# Patient Record
Sex: MALE | Race: WHITE | NOT HISPANIC OR LATINO | URBAN - METROPOLITAN AREA
[De-identification: names, ages, dates, MRNs, and addresses within clinical notes are randomized per-mention and may not be internally consistent; named-entity substitution may affect disease eponyms.]

---

## 2018-05-24 ENCOUNTER — EMERGENCY (EMERGENCY)
Facility: HOSPITAL | Age: 23
LOS: 0 days | Discharge: HOME | End: 2018-05-24
Admitting: PHYSICIAN ASSISTANT

## 2018-05-24 VITALS
TEMPERATURE: 98 F | SYSTOLIC BLOOD PRESSURE: 172 MMHG | DIASTOLIC BLOOD PRESSURE: 96 MMHG | RESPIRATION RATE: 18 BRPM | HEART RATE: 98 BPM | OXYGEN SATURATION: 99 %

## 2018-05-24 DIAGNOSIS — S92.252A DISPLACED FRACTURE OF NAVICULAR [SCAPHOID] OF LEFT FOOT, INITIAL ENCOUNTER FOR CLOSED FRACTURE: ICD-10-CM
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
## 2018-05-24 DIAGNOSIS — S99.912A UNSPECIFIED INJURY OF LEFT ANKLE, INITIAL ENCOUNTER: ICD-10-CM
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
## 2018-05-24 NOTE — ED PROVIDER NOTE - PROGRESS NOTE DETAILS
Patient advised of possible navicular fx. He will f/u with his own orthopedist. Patient advised of possible navicular fx. He will f/u with his own orthopedist.  *****FRACTURE CARE*****

## 2018-05-24 NOTE — ED PROVIDER NOTE - NS ED ROS FT
Constitutional: (-) fever  Skin: No rash  Muskuloskeletal: left foot/ankle pain  Neurological: (-) altered mental status
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
## 2018-05-24 NOTE — ED PROVIDER NOTE - PHYSICAL EXAMINATION
Physical Exam    Vital Signs: I have reviewed the initial vital signs.  Constitutional: well-nourished, appears stated age, no acute distress  Skin: warm, dry, No rash  Muskuloskeletal: Left ankle/foot: +tender, swelling to ankle and dorsum of foot. No ecchymosis. Pain with ROM. n/v intact. limping gait in ED  Neuro: AOx3, Motor 5/5, Sensation: equal and intact

## 2019-08-04 ENCOUNTER — EMERGENCY (EMERGENCY)
Facility: HOSPITAL | Age: 24
LOS: 0 days | Discharge: HOME | End: 2019-08-04
Attending: EMERGENCY MEDICINE | Admitting: EMERGENCY MEDICINE
Payer: OTHER MISCELLANEOUS

## 2019-08-04 VITALS
TEMPERATURE: 98 F | RESPIRATION RATE: 18 BRPM | HEART RATE: 90 BPM | DIASTOLIC BLOOD PRESSURE: 60 MMHG | OXYGEN SATURATION: 98 % | SYSTOLIC BLOOD PRESSURE: 134 MMHG

## 2019-08-04 VITALS
DIASTOLIC BLOOD PRESSURE: 81 MMHG | SYSTOLIC BLOOD PRESSURE: 147 MMHG | RESPIRATION RATE: 17 BRPM | TEMPERATURE: 98 F | HEART RATE: 125 BPM | OXYGEN SATURATION: 97 %

## 2019-08-04 DIAGNOSIS — R07.89 OTHER CHEST PAIN: ICD-10-CM

## 2019-08-04 DIAGNOSIS — M25.511 PAIN IN RIGHT SHOULDER: ICD-10-CM

## 2019-08-04 DIAGNOSIS — S70.11XA CONTUSION OF RIGHT THIGH, INITIAL ENCOUNTER: ICD-10-CM
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
## 2019-08-04 DIAGNOSIS — S40.211A ABRASION OF RIGHT SHOULDER, INITIAL ENCOUNTER: ICD-10-CM

## 2019-08-04 DIAGNOSIS — S42.101A FRACTURE OF UNSPECIFIED PART OF SCAPULA, RIGHT SHOULDER, INITIAL ENCOUNTER FOR CLOSED FRACTURE: ICD-10-CM

## 2019-08-04 DIAGNOSIS — M25.519 PAIN IN UNSPECIFIED SHOULDER: ICD-10-CM

## 2019-08-04 DIAGNOSIS — S71.101A UNSPECIFIED OPEN WOUND, RIGHT THIGH, INITIAL ENCOUNTER: ICD-10-CM
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
## 2019-08-04 DIAGNOSIS — R06.02 SHORTNESS OF BREATH: ICD-10-CM

## 2019-08-04 LAB
ALBUMIN SERPL ELPH-MCNC: 3.7 G/DL — SIGNIFICANT CHANGE UP (ref 3.5–5.2)
ALP SERPL-CCNC: 43 U/L — SIGNIFICANT CHANGE UP (ref 30–115)
ALT FLD-CCNC: 16 U/L — SIGNIFICANT CHANGE UP (ref 0–41)
ANION GAP SERPL CALC-SCNC: 10 MMOL/L — SIGNIFICANT CHANGE UP (ref 7–14)
AST SERPL-CCNC: 34 U/L — SIGNIFICANT CHANGE UP (ref 0–41)
BASOPHILS # BLD AUTO: 0.05 K/UL — SIGNIFICANT CHANGE UP (ref 0–0.2)
BASOPHILS NFR BLD AUTO: 0.5 % — SIGNIFICANT CHANGE UP (ref 0–1)
BILIRUB SERPL-MCNC: 0.6 MG/DL — SIGNIFICANT CHANGE UP (ref 0.2–1.2)
BUN SERPL-MCNC: 14 MG/DL — SIGNIFICANT CHANGE UP (ref 10–20)
CALCIUM SERPL-MCNC: 7.3 MG/DL — LOW (ref 8.5–10.1)
CHLORIDE SERPL-SCNC: 102 MMOL/L — SIGNIFICANT CHANGE UP (ref 98–110)
CO2 SERPL-SCNC: 29 MMOL/L — SIGNIFICANT CHANGE UP (ref 17–32)
CREAT SERPL-MCNC: 0.6 MG/DL — LOW (ref 0.7–1.5)
D DIMER BLD IA.RAPID-MCNC: 462 NG/ML DDU — HIGH (ref 0–230)
EOSINOPHIL # BLD AUTO: 0.12 K/UL — SIGNIFICANT CHANGE UP (ref 0–0.7)
EOSINOPHIL NFR BLD AUTO: 1.2 % — SIGNIFICANT CHANGE UP (ref 0–8)
GLUCOSE SERPL-MCNC: 91 MG/DL — SIGNIFICANT CHANGE UP (ref 70–99)
HCT VFR BLD CALC: 23.8 % — LOW (ref 42–52)
HGB BLD-MCNC: 8 G/DL — LOW (ref 14–18)
IMM GRANULOCYTES NFR BLD AUTO: 1.6 % — HIGH (ref 0.1–0.3)
LYMPHOCYTES # BLD AUTO: 2.88 K/UL — SIGNIFICANT CHANGE UP (ref 1.2–3.4)
LYMPHOCYTES # BLD AUTO: 29.2 % — SIGNIFICANT CHANGE UP (ref 20.5–51.1)
MCHC RBC-ENTMCNC: 28.6 PG — SIGNIFICANT CHANGE UP (ref 27–31)
MCHC RBC-ENTMCNC: 33.6 G/DL — SIGNIFICANT CHANGE UP (ref 32–37)
MCV RBC AUTO: 85 FL — SIGNIFICANT CHANGE UP (ref 80–94)
MONOCYTES # BLD AUTO: 0.9 K/UL — HIGH (ref 0.1–0.6)
MONOCYTES NFR BLD AUTO: 9.1 % — SIGNIFICANT CHANGE UP (ref 1.7–9.3)
NEUTROPHILS # BLD AUTO: 5.76 K/UL — SIGNIFICANT CHANGE UP (ref 1.4–6.5)
NEUTROPHILS NFR BLD AUTO: 58.4 % — SIGNIFICANT CHANGE UP (ref 42.2–75.2)
NRBC # BLD: 0 /100 WBCS — SIGNIFICANT CHANGE UP (ref 0–0)
PLATELET # BLD AUTO: 195 K/UL — SIGNIFICANT CHANGE UP (ref 130–400)
POTASSIUM SERPL-MCNC: 3.3 MMOL/L — LOW (ref 3.5–5)
POTASSIUM SERPL-SCNC: 3.3 MMOL/L — LOW (ref 3.5–5)
PROT SERPL-MCNC: 5.7 G/DL — LOW (ref 6–8)
RBC # BLD: 2.8 M/UL — LOW (ref 4.7–6.1)
RBC # FLD: 12.6 % — SIGNIFICANT CHANGE UP (ref 11.5–14.5)
SODIUM SERPL-SCNC: 141 MMOL/L — SIGNIFICANT CHANGE UP (ref 135–146)
WBC # BLD: 9.87 K/UL — SIGNIFICANT CHANGE UP (ref 4.8–10.8)
WBC # FLD AUTO: 9.87 K/UL — SIGNIFICANT CHANGE UP (ref 4.8–10.8)

## 2019-08-04 PROCEDURE — 71045 X-RAY EXAM CHEST 1 VIEW: CPT | Mod: 26

## 2019-08-04 PROCEDURE — 73030 X-RAY EXAM OF SHOULDER: CPT | Mod: 26,RT

## 2019-08-04 PROCEDURE — 71275 CT ANGIOGRAPHY CHEST: CPT | Mod: 26

## 2019-08-04 PROCEDURE — 99284 EMERGENCY DEPT VISIT MOD MDM: CPT

## 2019-08-04 RX ORDER — OXYCODONE AND ACETAMINOPHEN 5; 325 MG/1; MG/1
2 TABLET ORAL ONCE
Refills: 0 | Status: DISCONTINUED | OUTPATIENT
Start: 2019-08-04 | End: 2019-08-04

## 2019-08-04 RX ORDER — ACETAMINOPHEN 500 MG
650 TABLET ORAL ONCE
Refills: 0 | Status: DISCONTINUED | OUTPATIENT
Start: 2019-08-04 | End: 2019-08-04

## 2019-08-04 RX ADMIN — OXYCODONE AND ACETAMINOPHEN 2 TABLET(S): 5; 325 TABLET ORAL at 13:03

## 2019-08-04 RX ADMIN — OXYCODONE AND ACETAMINOPHEN 2 TABLET(S): 5; 325 TABLET ORAL at 19:04

## 2019-08-04 RX ADMIN — OXYCODONE AND ACETAMINOPHEN 2 TABLET(S): 5; 325 TABLET ORAL at 13:45
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
## 2019-08-04 NOTE — ED PROVIDER NOTE - PROVIDER TOKENS
FREE:[LAST:[[facility] trauma surgery],PHONE:[(   )    -],FAX:[(   )    -],FOLLOWUP:[1-3 Days]]

## 2019-08-04 NOTE — ED ADULT NURSE REASSESSMENT NOTE - NS ED NURSE REASSESS COMMENT FT1
Pts R back dressing changed, xeroform applied as per dressing that was removed. ABD pad placed on pts R hip and VANE drain site.

## 2019-08-04 NOTE — ED PROVIDER NOTE - CARE PLAN
Principal Discharge DX:	Acute pain of right shoulder  Secondary Diagnosis:	Scapula fracture  Secondary Diagnosis:	Wound  Secondary Diagnosis:	Shortness of breath

## 2019-08-04 NOTE — ED PROVIDER NOTE - PHYSICAL EXAMINATION
Vital Signs: I have reviewed the initial vital signs.  Constitutional: NAD, well-nourished, appears stated age, no acute distress.  HEENT: Airway patent, moist MM, no erythema/swelling/deformity of oral structures. EOMI, PERRLA.  CV: regular rate, regular rhythm, well-perfused extremities, 2+ b/l DP and radial pulses equal.  Lungs: BCTA, no increased WOB.  ABD: NTND, no guarding or rebound, no pulsatile mass, no hernias.   MSK: Neck supple, nontender, nl ROM, no stepoff. Chest nontender. Back nontender in TLS spine or to b/l bony structures or flanks. RLE in boot 2/2 ankle sprain  INTEG: Skin warm, dry, no rash. significant abraded skin to posterolateral R chest wall near shoulder. R hip wound site clean, dressing in place. VANE with 10cc serosanguinous fluid  NEURO: A&Ox3, moving all extremities, normal speech  PSYCH: Calm, cooperative, normal affect and interaction.
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
## 2019-08-04 NOTE — ED PROVIDER NOTE - NS ED ROS FT
Eyes:  No visual changes, eye pain or discharge.  ENMT:  No hearing changes, pain, no sore throat or runny nose, no difficulty swallowing  Cardiac:  +cp, sob. No chest pain with exertion.  Respiratory:  No cough or respiratory distress.    GI:  No nausea, vomiting, diarrhea or abdominal pain.  :  No dysuria, frequency or burning.  MS:  No myalgia, muscle weakness,+ joint pain. no back pain.  Neuro:  No headache or weakness.  No LOC.  Skin:  No skin rash. +wound site  Endocrine: No history of thyroid disease or diabetes.
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
## 2019-08-04 NOTE — ED PROVIDER NOTE - NSFOLLOWUPINSTRUCTIONS_ED_ALL_ED_FT
Shortness of breath    Shortness of breath (dyspnea) means you have trouble breathing and could indicate a medical problem. Causes include lung disease, heart disease, low amount of red blood cells (anemia), poor physical fitness, being overweight, smoking, etc. Your health care provider today may not be able to find a cause for your shortness of breath after your exam. In this case, it is important to have a follow-up exam with your primary care physician as instructed. If medicines were prescribed, take them as directed for the full length of time directed. Refrain from tobacco products.    SEEK IMMEDIATE MEDICAL CARE IF YOU HAVE ANY OF THE FOLLOWING SYMPTOMS: worsening shortness of breath, chest pain, back pain, abdominal pain, fever, coughing up blood, lightheadedness/dizziness.    Chest Pain    Chest pain can be caused by many different conditions which may or may not be dangerous. Causes include heartburn, lung infections, heart attack, blood clot in lungs, skin infections, strain or damage to muscle, cartilage, or bones, etc. In addition to a history and physical examination, an electrocardiogram (ECG) or other lab tests may have been performed to determine the cause of your chest pain. Follow up with your primary care provider or with a cardiologist as instructed.     SEEK IMMEDIATE MEDICAL CARE IF YOU HAVE ANY OF THE FOLLOWING SYMPTOMS: worsening chest pain, coughing up blood, unexplained back/neck/jaw pain, severe abdominal pain, dizziness or lightheadedness, fainting, shortness of breath, sweaty or clammy skin, vomiting, or racing heart beat. These symptoms may represent a serious problem that is an emergency. Do not wait to see if the symptoms will go away. Get medical help right away. Call 911 and do not drive yourself to the hospital.
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
## 2019-08-04 NOTE — ED PROVIDER NOTE - CARE PROVIDER_API CALL
[facility] trauma surgery,   Phone: (   )    -  Fax: (   )    -  Follow Up Time: 1-3 Days
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
## 2019-08-04 NOTE — ED ADULT NURSE NOTE - INTERVENTIONS DEFINITIONS
Monitor for mental status changes and reorient to person, place, and time/Provide visual cue, wrist band, yellow gown, etc./Non-slip footwear when patient is off stretcher/Physically safe environment: no spills, clutter or unnecessary equipment/Room bathroom lighting operational/Stretcher in lowest position, wheels locked, appropriate side rails in place/Monitor gait and stability/Reinforce activity limits and safety measures with patient and family

## 2019-08-10 ENCOUNTER — INPATIENT (INPATIENT)
Facility: HOSPITAL | Age: 24
LOS: 2 days | Discharge: HOME HEALTH | End: 2019-08-13
Attending: INTERNAL MEDICINE | Admitting: INTERNAL MEDICINE
Payer: OTHER MISCELLANEOUS

## 2019-08-10 VITALS
SYSTOLIC BLOOD PRESSURE: 143 MMHG | DIASTOLIC BLOOD PRESSURE: 65 MMHG | OXYGEN SATURATION: 100 % | RESPIRATION RATE: 19 BRPM | HEART RATE: 109 BPM | TEMPERATURE: 99 F

## 2019-08-10 DIAGNOSIS — Z98.890 OTHER SPECIFIED POSTPROCEDURAL STATES: Chronic | ICD-10-CM

## 2019-08-10 LAB
ALBUMIN SERPL ELPH-MCNC: 4.1 G/DL — SIGNIFICANT CHANGE UP (ref 3.5–5.2)
ALP SERPL-CCNC: 85 U/L — SIGNIFICANT CHANGE UP (ref 30–115)
ALT FLD-CCNC: 87 U/L — HIGH (ref 0–41)
ANION GAP SERPL CALC-SCNC: 15 MMOL/L — HIGH (ref 7–14)
ANISOCYTOSIS BLD QL: SLIGHT — SIGNIFICANT CHANGE UP
APPEARANCE UR: CLEAR — SIGNIFICANT CHANGE UP
AST SERPL-CCNC: 31 U/L — SIGNIFICANT CHANGE UP (ref 0–41)
BASOPHILS # BLD AUTO: 0 K/UL — SIGNIFICANT CHANGE UP (ref 0–0.2)
BASOPHILS NFR BLD AUTO: 0 % — SIGNIFICANT CHANGE UP (ref 0–1)
BILIRUB SERPL-MCNC: 1 MG/DL — SIGNIFICANT CHANGE UP (ref 0.2–1.2)
BILIRUB UR-MCNC: NEGATIVE — SIGNIFICANT CHANGE UP
BUN SERPL-MCNC: 17 MG/DL — SIGNIFICANT CHANGE UP (ref 10–20)
CALCIUM SERPL-MCNC: 7.8 MG/DL — LOW (ref 8.5–10.1)
CHLORIDE SERPL-SCNC: 97 MMOL/L — LOW (ref 98–110)
CO2 SERPL-SCNC: 28 MMOL/L — SIGNIFICANT CHANGE UP (ref 17–32)
COLOR SPEC: YELLOW — SIGNIFICANT CHANGE UP
CREAT SERPL-MCNC: 0.7 MG/DL — SIGNIFICANT CHANGE UP (ref 0.7–1.5)
DIFF PNL FLD: NEGATIVE — SIGNIFICANT CHANGE UP
EOSINOPHIL # BLD AUTO: 0.49 K/UL — SIGNIFICANT CHANGE UP (ref 0–0.7)
EOSINOPHIL NFR BLD AUTO: 2.7 % — SIGNIFICANT CHANGE UP (ref 0–8)
GIANT PLATELETS BLD QL SMEAR: PRESENT — SIGNIFICANT CHANGE UP
GLUCOSE SERPL-MCNC: 77 MG/DL — SIGNIFICANT CHANGE UP (ref 70–99)
GLUCOSE UR QL: NEGATIVE — SIGNIFICANT CHANGE UP
HCT VFR BLD CALC: 25.6 % — LOW (ref 42–52)
HGB BLD-MCNC: 8.4 G/DL — LOW (ref 14–18)
HYPOCHROMIA BLD QL: SLIGHT — SIGNIFICANT CHANGE UP
KETONES UR-MCNC: NEGATIVE — SIGNIFICANT CHANGE UP
LACTATE SERPL-SCNC: 1.3 MMOL/L — SIGNIFICANT CHANGE UP (ref 0.5–2.2)
LEUKOCYTE ESTERASE UR-ACNC: NEGATIVE — SIGNIFICANT CHANGE UP
LYMPHOCYTES # BLD AUTO: 1.6 K/UL — SIGNIFICANT CHANGE UP (ref 1.2–3.4)
LYMPHOCYTES # BLD AUTO: 8.8 % — LOW (ref 20.5–51.1)
MANUAL SMEAR VERIFICATION: SIGNIFICANT CHANGE UP
MCHC RBC-ENTMCNC: 28.9 PG — SIGNIFICANT CHANGE UP (ref 27–31)
MCHC RBC-ENTMCNC: 32.8 G/DL — SIGNIFICANT CHANGE UP (ref 32–37)
MCV RBC AUTO: 88 FL — SIGNIFICANT CHANGE UP (ref 80–94)
MICROCYTES BLD QL: SLIGHT — SIGNIFICANT CHANGE UP
MONOCYTES # BLD AUTO: 1.6 K/UL — HIGH (ref 0.1–0.6)
MONOCYTES NFR BLD AUTO: 8.8 % — SIGNIFICANT CHANGE UP (ref 1.7–9.3)
MYELOCYTES NFR BLD: 2.7 % — HIGH (ref 0–0)
NEUTROPHILS # BLD AUTO: 14.01 K/UL — HIGH (ref 1.4–6.5)
NEUTROPHILS NFR BLD AUTO: 77 % — HIGH (ref 42.2–75.2)
NITRITE UR-MCNC: NEGATIVE — SIGNIFICANT CHANGE UP
PH UR: 6.5 — SIGNIFICANT CHANGE UP (ref 5–8)
PLAT MORPH BLD: NORMAL — SIGNIFICANT CHANGE UP
PLATELET # BLD AUTO: 437 K/UL — HIGH (ref 130–400)
POIKILOCYTOSIS BLD QL AUTO: SLIGHT — SIGNIFICANT CHANGE UP
POLYCHROMASIA BLD QL SMEAR: SLIGHT — SIGNIFICANT CHANGE UP
POTASSIUM SERPL-MCNC: 3.8 MMOL/L — SIGNIFICANT CHANGE UP (ref 3.5–5)
POTASSIUM SERPL-SCNC: 3.8 MMOL/L — SIGNIFICANT CHANGE UP (ref 3.5–5)
PROT SERPL-MCNC: 6.9 G/DL — SIGNIFICANT CHANGE UP (ref 6–8)
PROT UR-MCNC: SIGNIFICANT CHANGE UP
RBC # BLD: 2.91 M/UL — LOW (ref 4.7–6.1)
RBC # FLD: 13.8 % — SIGNIFICANT CHANGE UP (ref 11.5–14.5)
RBC BLD AUTO: ABNORMAL
SODIUM SERPL-SCNC: 140 MMOL/L — SIGNIFICANT CHANGE UP (ref 135–146)
SP GR SPEC: 1.02 — SIGNIFICANT CHANGE UP (ref 1.01–1.02)
UROBILINOGEN FLD QL: ABNORMAL
WBC # BLD: 18.19 K/UL — HIGH (ref 4.8–10.8)
WBC # FLD AUTO: 18.19 K/UL — HIGH (ref 4.8–10.8)

## 2019-08-10 PROCEDURE — 99285 EMERGENCY DEPT VISIT HI MDM: CPT

## 2019-08-10 PROCEDURE — 71045 X-RAY EXAM CHEST 1 VIEW: CPT | Mod: 26

## 2019-08-10 PROCEDURE — 74177 CT ABD & PELVIS W/CONTRAST: CPT | Mod: 26

## 2019-08-10 PROCEDURE — 93971 EXTREMITY STUDY: CPT | Mod: 26,RT

## 2019-08-10 PROCEDURE — 93010 ELECTROCARDIOGRAM REPORT: CPT

## 2019-08-10 RX ORDER — MORPHINE SULFATE 50 MG/1
2 CAPSULE, EXTENDED RELEASE ORAL ONCE
Refills: 0 | Status: DISCONTINUED | OUTPATIENT
Start: 2019-08-10 | End: 2019-08-10

## 2019-08-10 RX ORDER — SODIUM CHLORIDE 9 MG/ML
1000 INJECTION, SOLUTION INTRAVENOUS ONCE
Refills: 0 | Status: COMPLETED | OUTPATIENT
Start: 2019-08-10 | End: 2019-08-10

## 2019-08-10 RX ORDER — ACETAMINOPHEN 500 MG
975 TABLET ORAL ONCE
Refills: 0 | Status: COMPLETED | OUTPATIENT
Start: 2019-08-10 | End: 2019-08-10

## 2019-08-10 RX ORDER — MORPHINE SULFATE 50 MG/1
4 CAPSULE, EXTENDED RELEASE ORAL ONCE
Refills: 0 | Status: DISCONTINUED | OUTPATIENT
Start: 2019-08-10 | End: 2019-08-10

## 2019-08-10 RX ADMIN — Medication 975 MILLIGRAM(S): at 14:33

## 2019-08-10 RX ADMIN — SODIUM CHLORIDE 1000 MILLILITER(S): 9 INJECTION, SOLUTION INTRAVENOUS at 14:33

## 2019-08-10 RX ADMIN — SODIUM CHLORIDE 1000 MILLILITER(S): 9 INJECTION, SOLUTION INTRAVENOUS at 22:27

## 2019-08-10 RX ADMIN — MORPHINE SULFATE 2 MILLIGRAM(S): 50 CAPSULE, EXTENDED RELEASE ORAL at 22:27

## 2019-08-10 RX ADMIN — MORPHINE SULFATE 4 MILLIGRAM(S): 50 CAPSULE, EXTENDED RELEASE ORAL at 16:36

## 2019-08-10 RX ADMIN — MORPHINE SULFATE 2 MILLIGRAM(S): 50 CAPSULE, EXTENDED RELEASE ORAL at 19:08

## 2019-08-10 NOTE — ED PROVIDER NOTE - PROGRESS NOTE DETAILS
H&H similar to 8/4, but much lower than on 8/2 just before surgery on his buttock , it was 14.2/40.6, there was no CBC after the procedure was done).  Uncertain source of fever, CXR and UA are WNL.  Patient is waiting for CT scan. Endorsed to Dr Roland to f/u CT scan and admit. Spoke to US, vascular duplex is negative for DVT d/w trauma, they are not convinced source is soft tissue infection. rec admission for w/u and treatment. discussed admission with patient.  Agreeable to admission.
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
## 2019-08-10 NOTE — ED ADULT NURSE NOTE - CHIEF COMPLAINT QUOTE
pt brought in from [facility] with low hemoglobin and elevated white count
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
## 2019-08-10 NOTE — ED PROVIDER NOTE - ATTENDING CONTRIBUTION TO CARE
24 yo male PMH as noted seen here on 8/4 for SOB, had labe and CTA of chest done negative for PE/.  Patient sent from urgent car clinic for evaluation of fever for several days.  States his appetite is normal, had been ambulating as much as he can, no headache, cough, abdominal pain or urinary complaints, states that VANE output has been steadily less than prior, has an appointment with hs surgeon in [place] on Monday for VANE drain and buttock staple removal.  Young male, resting on a stretcher, NAD, PERRL, pale skin and conjunctivae, nml work of breathing lungs CTA b/l, equal air entry b/l, RRR, distal pulses equal , no calf ttp, VANE drain in place without surrounding slin erythema or drainage, + large buttock ecchymosis, A&Ox3/  Will check labs, CT scan r/o collection /abscess, plan to admit, abx when source found.

## 2019-08-10 NOTE — ED PROVIDER NOTE - NS ED ROS FT
Constitutional: (+) fever (+) malaise (-) diaphoresis (-) chills   Respiratory: (-) cough (-) SOB   GI: (-) nausea (-) vomiting (-) diarrhea (-) abdominal pain   : (-) dysuria  (-) hematuria (-) incontinence  MS: (-) back pain   Neuro: (+) generalized weakness (-) headache (-) dizziness (-) numbness/tingling to extremities B/L  Skin: (-) rash  Except as documented in the HPI, all other systems are negative.
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
## 2019-08-11 LAB
BLD GP AB SCN SERPL QL: SIGNIFICANT CHANGE UP
CULTURE RESULTS: NO GROWTH — SIGNIFICANT CHANGE UP
FLU A RESULT: NEGATIVE — SIGNIFICANT CHANGE UP
FLU A RESULT: NEGATIVE — SIGNIFICANT CHANGE UP
FLUAV AG NPH QL: NEGATIVE — SIGNIFICANT CHANGE UP
FLUBV AG NPH QL: NEGATIVE — SIGNIFICANT CHANGE UP
HIV 1 & 2 AB SERPL IA.RAPID: SIGNIFICANT CHANGE UP
LDH SERPL L TO P-CCNC: 287 U/L — HIGH (ref 50–242)
MAGNESIUM SERPL-MCNC: 2.1 MG/DL — SIGNIFICANT CHANGE UP (ref 1.8–2.4)
MRSA PCR RESULT.: NEGATIVE — SIGNIFICANT CHANGE UP
RAPID RVP RESULT: SIGNIFICANT CHANGE UP
RSV RESULT: NEGATIVE — SIGNIFICANT CHANGE UP
RSV RNA RESP QL NAA+PROBE: NEGATIVE — SIGNIFICANT CHANGE UP
SPECIMEN SOURCE: SIGNIFICANT CHANGE UP

## 2019-08-11 PROCEDURE — 99231 SBSQ HOSP IP/OBS SF/LOW 25: CPT

## 2019-08-11 PROCEDURE — 99223 1ST HOSP IP/OBS HIGH 75: CPT

## 2019-08-11 RX ORDER — SENNA PLUS 8.6 MG/1
0 TABLET ORAL
Qty: 0 | Refills: 0 | DISCHARGE

## 2019-08-11 RX ORDER — HYDROCORTISONE 1 %
1 OINTMENT (GRAM) TOPICAL DAILY
Refills: 0 | Status: DISCONTINUED | OUTPATIENT
Start: 2019-08-11 | End: 2019-08-13

## 2019-08-11 RX ORDER — OXYCODONE HYDROCHLORIDE 5 MG/1
0 TABLET ORAL
Qty: 0 | Refills: 0 | DISCHARGE

## 2019-08-11 RX ORDER — MORPHINE SULFATE 50 MG/1
4 CAPSULE, EXTENDED RELEASE ORAL ONCE
Refills: 0 | Status: DISCONTINUED | OUTPATIENT
Start: 2019-08-11 | End: 2019-08-11

## 2019-08-11 RX ORDER — OXYCODONE HYDROCHLORIDE 5 MG/1
5 TABLET ORAL
Refills: 0 | Status: DISCONTINUED | OUTPATIENT
Start: 2019-08-11 | End: 2019-08-12

## 2019-08-11 RX ORDER — SENNA PLUS 8.6 MG/1
2 TABLET ORAL AT BEDTIME
Refills: 0 | Status: DISCONTINUED | OUTPATIENT
Start: 2019-08-11 | End: 2019-08-13

## 2019-08-11 RX ORDER — ACETAMINOPHEN 500 MG
3 TABLET ORAL
Qty: 0 | Refills: 0 | DISCHARGE

## 2019-08-11 RX ORDER — ACETAMINOPHEN 500 MG
650 TABLET ORAL EVERY 6 HOURS
Refills: 0 | Status: DISCONTINUED | OUTPATIENT
Start: 2019-08-11 | End: 2019-08-13

## 2019-08-11 RX ORDER — CEFEPIME 1 G/1
1000 INJECTION, POWDER, FOR SOLUTION INTRAMUSCULAR; INTRAVENOUS EVERY 8 HOURS
Refills: 0 | Status: DISCONTINUED | OUTPATIENT
Start: 2019-08-11 | End: 2019-08-13

## 2019-08-11 RX ORDER — VANCOMYCIN HCL 1 G
1000 VIAL (EA) INTRAVENOUS ONCE
Refills: 0 | Status: COMPLETED | OUTPATIENT
Start: 2019-08-11 | End: 2019-08-11

## 2019-08-11 RX ORDER — GABAPENTIN 400 MG/1
1 CAPSULE ORAL
Qty: 0 | Refills: 0 | DISCHARGE

## 2019-08-11 RX ORDER — SENNA PLUS 8.6 MG/1
2 TABLET ORAL
Qty: 0 | Refills: 0 | DISCHARGE

## 2019-08-11 RX ORDER — MORPHINE SULFATE 50 MG/1
6 CAPSULE, EXTENDED RELEASE ORAL ONCE
Refills: 0 | Status: DISCONTINUED | OUTPATIENT
Start: 2019-08-11 | End: 2019-08-11

## 2019-08-11 RX ORDER — DOCUSATE SODIUM 100 MG
100 CAPSULE ORAL
Refills: 0 | Status: DISCONTINUED | OUTPATIENT
Start: 2019-08-11 | End: 2019-08-13

## 2019-08-11 RX ORDER — GABAPENTIN 400 MG/1
0 CAPSULE ORAL
Qty: 0 | Refills: 0 | DISCHARGE

## 2019-08-11 RX ORDER — CHOLECALCIFEROL (VITAMIN D3) 125 MCG
0 CAPSULE ORAL
Qty: 0 | Refills: 0 | DISCHARGE

## 2019-08-11 RX ORDER — DOCUSATE SODIUM 100 MG
1 CAPSULE ORAL
Qty: 0 | Refills: 0 | DISCHARGE

## 2019-08-11 RX ORDER — VANCOMYCIN HCL 1 G
1250 VIAL (EA) INTRAVENOUS EVERY 12 HOURS
Refills: 0 | Status: DISCONTINUED | OUTPATIENT
Start: 2019-08-11 | End: 2019-08-13

## 2019-08-11 RX ORDER — SENNA PLUS 8.6 MG/1
1 TABLET ORAL
Qty: 0 | Refills: 0 | DISCHARGE

## 2019-08-11 RX ORDER — GABAPENTIN 400 MG/1
300 CAPSULE ORAL THREE TIMES A DAY
Refills: 0 | Status: DISCONTINUED | OUTPATIENT
Start: 2019-08-11 | End: 2019-08-13

## 2019-08-11 RX ORDER — ENOXAPARIN SODIUM 100 MG/ML
40 INJECTION SUBCUTANEOUS DAILY
Refills: 0 | Status: DISCONTINUED | OUTPATIENT
Start: 2019-08-11 | End: 2019-08-13

## 2019-08-11 RX ORDER — ALBUTEROL 90 UG/1
1 AEROSOL, METERED ORAL
Qty: 30 | Refills: 0
Start: 2019-08-11 | End: 2019-08-24

## 2019-08-11 RX ADMIN — Medication 166.67 MILLIGRAM(S): at 17:08

## 2019-08-11 RX ADMIN — MORPHINE SULFATE 6 MILLIGRAM(S): 50 CAPSULE, EXTENDED RELEASE ORAL at 01:25

## 2019-08-11 RX ADMIN — OXYCODONE HYDROCHLORIDE 5 MILLIGRAM(S): 5 TABLET ORAL at 14:00

## 2019-08-11 RX ADMIN — ENOXAPARIN SODIUM 40 MILLIGRAM(S): 100 INJECTION SUBCUTANEOUS at 11:45

## 2019-08-11 RX ADMIN — OXYCODONE HYDROCHLORIDE 5 MILLIGRAM(S): 5 TABLET ORAL at 13:04

## 2019-08-11 RX ADMIN — SENNA PLUS 2 TABLET(S): 8.6 TABLET ORAL at 21:33

## 2019-08-11 RX ADMIN — MORPHINE SULFATE 4 MILLIGRAM(S): 50 CAPSULE, EXTENDED RELEASE ORAL at 08:21

## 2019-08-11 RX ADMIN — Medication 250 MILLIGRAM(S): at 01:24

## 2019-08-11 RX ADMIN — OXYCODONE HYDROCHLORIDE 5 MILLIGRAM(S): 5 TABLET ORAL at 22:15

## 2019-08-11 RX ADMIN — GABAPENTIN 300 MILLIGRAM(S): 400 CAPSULE ORAL at 13:05

## 2019-08-11 RX ADMIN — CEFEPIME 100 MILLIGRAM(S): 1 INJECTION, POWDER, FOR SOLUTION INTRAMUSCULAR; INTRAVENOUS at 15:03

## 2019-08-11 RX ADMIN — GABAPENTIN 300 MILLIGRAM(S): 400 CAPSULE ORAL at 21:33

## 2019-08-11 RX ADMIN — MORPHINE SULFATE 4 MILLIGRAM(S): 50 CAPSULE, EXTENDED RELEASE ORAL at 08:50

## 2019-08-11 RX ADMIN — Medication 100 MILLIGRAM(S): at 00:47

## 2019-08-11 RX ADMIN — OXYCODONE HYDROCHLORIDE 5 MILLIGRAM(S): 5 TABLET ORAL at 18:46

## 2019-08-11 RX ADMIN — Medication 1 APPLICATION(S): at 18:39

## 2019-08-11 RX ADMIN — CEFEPIME 100 MILLIGRAM(S): 1 INJECTION, POWDER, FOR SOLUTION INTRAMUSCULAR; INTRAVENOUS at 21:33

## 2019-08-11 RX ADMIN — Medication 100 MILLIGRAM(S): at 17:11

## 2019-08-11 NOTE — H&P ADULT - ASSESSMENT
Sepsis from unknown source   - CT abdo/pelvic: Mild inflammatory change to right gluteal subcutaneous tissues with drainage catheter, correlate for cellulitis; no evidence of abscess. Partially imaged right scapular fracture.  - UA negative.   - f/u BCx --> If positive, will get 2d Echo  - check HIV, QuantiFeron, ESR, CRP, LDH   - c/w abx  - c/w Tylenol prn   - ID consulted.   - admit to medicine       DVT ppx: Lovenox SC  GI ppx: not indicated.   Activity OOBC  Diet: Regular diet 22 yo male w/ no PMH presents to ED for generalized weakness and fever.    Sepsis   - CT abdo/pelvic: Mild inflammatory change to right gluteal subcutaneous tissues with drainage catheter, correlate for cellulitis; no evidence of abscess. Partially imaged right scapular fracture.  - UA negative.   - f/u BCx --> If positive, will get 2d Echo  - check RVP, HIV, QuantiFeron, ESR, CRP, LDH   - will start on broad spectrum abx (Cefepime and Vanco) as patient was recently hospitalized and had surgery.  - c/w Tylenol prn   - ID consulted.   - admit to medicine     Anemia likely from right thigh hematoma   - Hb stable ~ 8.4  -no evidence of acute bleeding.   - monitor CBC (transfuse if Hb < 7)  - Keep active T and S.       DVT ppx: Lovenox SC  GI ppx: not indicated.   Activity OOBC  Diet: Regular diet

## 2019-08-11 NOTE — H&P ADULT - NSHPLABSRESULTS_GEN_ALL_CORE
8.4    18.19 )-----------( 437      ( 10 Aug 2019 14:45 )             25.6             08-10    140  |  97<L>  |  17  ----------------------------<  77  3.8   |  28  |  0.7    Ca    7.8<L>      10 Aug 2019 14:45    TPro  6.9  /  Alb  4.1  /  TBili  1.0  /  DBili  x   /  AST  31  /  ALT  87<H>  /  AlkPhos  85  08-10    LIVER FUNCTIONS - ( 10 Aug 2019 14:45 )  Alb: 4.1 g/dL / Pro: 6.9 g/dL / ALK PHOS: 85 U/L / ALT: 87 U/L / AST: 31 U/L / GGT: x                         Urinalysis Basic - ( 10 Aug 2019 14:09 )    Color: Yellow / Appearance: Clear / S.024 / pH: x  Gluc: x / Ketone: Negative  / Bili: Negative / Urobili: 6 mg/dL   Blood: x / Protein: Trace / Nitrite: Negative   Leuk Esterase: Negative / RBC: x / WBC x   Sq Epi: x / Non Sq Epi: x / Bacteria: x

## 2019-08-11 NOTE — PROGRESS NOTE ADULT - SUBJECTIVE AND OBJECTIVE BOX
GENERAL SURGERY PROGRESS NOTE     RODOLFO LOCO  23y  Male  Hospital day :  POD:  Procedure:     OVERNIGHT EVENTS:  Minimal pain in lower back, attributes it to bruising. No N/V/F/C. Ambulating with cane. Voiding freely.    T(F): 98.7 (19 @ 04:20), Max: 100.7 (08-10-19 @ 14:00)  HR: 98 (19 @ 04:20) (92 - 109)  BP: 124/64 (19 @ 04:20) (124/64 - 143/65)  ABP: --  ABP(mean): --  RR: 18 (19 @ 04:20) (18 - 19)  SpO2: 97% (19 @ 02:00) (97% - 100%)    PHYSICAL EXAM:  GENERAL: NAD, well-appearing  CHEST/LUNG: Clear to auscultation bilaterally  HEART: Regular rate and rhythm  ABDOMEN: Soft, Nontender, Nondistended; mild bruising in RLQ  EXTREMITIES: R arm in sling, Remy drain with serosanguinous fluid from R buttock, dressing in place      LABS  Labs:  CAPILLARY BLOOD GLUCOSE                              8.4    18.19 )-----------( 437      ( 10 Aug 2019 14:45 )             25.6       Auto Neutrophil %: 77.0 % (08-10-19 @ 14:45)    08-10    140  |  97<L>  |  17  ----------------------------<  77  3.8   |  28  |  0.7      Calcium, Total Serum: 7.8 mg/dL (08-10-19 @ 14:45)      LFTs:             6.9  | 1.0  | 31       ------------------[85      ( 10 Aug 2019 14:45 )  4.1  | x    | 87             Lactate, Blood: 1.3 mmol/L (08-10-19 @ 14:45)    Urinalysis Basic - ( 10 Aug 2019 14:09 )    Color: Yellow / Appearance: Clear / S.024 / pH: x  Gluc: x / Ketone: Negative  / Bili: Negative / Urobili: 6 mg/dL   Blood: x / Protein: Trace / Nitrite: Negative   Leuk Esterase: Negative / RBC: x / WBC x   Sq Epi: x / Non Sq Epi: x / Bacteria: x
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
## 2019-08-11 NOTE — CHART NOTE - NSCHARTNOTEFT_GEN_A_CORE
Senior Note  I have personally examined and evaluated the patient  Pt surgical wound is CDI . No evidence of any soft tissue infection.  With increased wbc 18 and low grade fever pt needs further workup  Recommend admission and further workup  Surgery will follow pt  I agree with the above plan and note  Surgical Attending Dr. Maurer aware and agrees with plan

## 2019-08-11 NOTE — H&P ADULT - NSICDXPASTMEDICALHX_GEN_ALL_CORE_FT
PAST MEDICAL HISTORY:  Compartment syndrome     No pertinent past medical history     Scapular fracture
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
## 2019-08-11 NOTE — H&P ADULT - NSHPREVIEWOFSYSTEMS_GEN_ALL_CORE
REVIEW OF SYSTEMS:    CONSTITUTIONAL: No weakness, fevers or chills  EYES/ENT: No visual changes;  No vertigo or throat pain   NECK: No pain or stiffness.   RESPIRATORY: No cough, wheezing, hemoptysis; No shortness of breath  CARDIOVASCULAR: No chest pain or palpitations  GASTROINTESTINAL: No abdominal or epigastric pain. No nausea, vomiting, or hematemesis; No diarrhea or constipation. No melena or hematochezia.  GENITOURINARY: No dysuria, frequency or hematuria  NEUROLOGICAL: No numbness or weakness  SKIN: No itching, rashes REVIEW OF SYSTEMS:    CONSTITUTIONAL: + weakness, fever, chills.   EYES/ENT: No visual changes;  No vertigo or throat pain   NECK: No pain or stiffness.   RESPIRATORY: No cough, wheezing, hemoptysis; No shortness of breath  CARDIOVASCULAR: No chest pain or palpitations  GASTROINTESTINAL: No abdominal or epigastric pain. No nausea, vomiting, or hematemesis; No diarrhea or constipation. No melena or hematochezia.  GENITOURINARY: No dysuria, frequency or hematuria  NEUROLOGICAL: No numbness or weakness.  MSK: + pain of right shoulder/thigh/back.   SKIN: No itching, rashes

## 2019-08-11 NOTE — H&P ADULT - NSHPPHYSICALEXAM_GEN_ALL_CORE
PHYSICAL EXAM:  GEN: No acute distress  HEENT: EOMI. No sclera icterus.   LUNGS: Clear to auscultation bilaterally   HEART: S1/S2 present. RRR.   ABD: Soft, non-tender, non-distended. Bowel sounds present  EXT: No pitting edema.   NEURO: AAOX3. Non focal. PHYSICAL EXAM:  GEN: No acute distress.  HEENT: EOMI. No sclera icterus.   LUNGS: Clear to auscultation bilaterally. No wheeze/rales/crackles.   HEART: S1/S2 present. RRR. NO murmur/gallop/rubs.   ABD: Soft, non-tender, non-distended. Bowel sounds present  EXT: No pitting edema. + right thigh bruises with stiches. No erythema but has induration underneath the stiches and drain. + VANE drain with serosanguinous fluid.  + superficial skin scrap on right sided of the upper back.   NEURO: AAOX3. Non focal.
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
## 2019-08-12 LAB
ALBUMIN SERPL ELPH-MCNC: 3.7 G/DL — SIGNIFICANT CHANGE UP (ref 3.5–5.2)
ALP SERPL-CCNC: 73 U/L — SIGNIFICANT CHANGE UP (ref 30–115)
ALT FLD-CCNC: 39 U/L — SIGNIFICANT CHANGE UP (ref 0–41)
ANION GAP SERPL CALC-SCNC: 15 MMOL/L — HIGH (ref 7–14)
ANION GAP SERPL CALC-SCNC: 15 MMOL/L — HIGH (ref 7–14)
AST SERPL-CCNC: 16 U/L — SIGNIFICANT CHANGE UP (ref 0–41)
BASOPHILS # BLD AUTO: 0.04 K/UL — SIGNIFICANT CHANGE UP (ref 0–0.2)
BASOPHILS # BLD AUTO: 0.06 K/UL — SIGNIFICANT CHANGE UP (ref 0–0.2)
BASOPHILS NFR BLD AUTO: 0.4 % — SIGNIFICANT CHANGE UP (ref 0–1)
BASOPHILS NFR BLD AUTO: 0.5 % — SIGNIFICANT CHANGE UP (ref 0–1)
BILIRUB SERPL-MCNC: 0.8 MG/DL — SIGNIFICANT CHANGE UP (ref 0.2–1.2)
BUN SERPL-MCNC: 13 MG/DL — SIGNIFICANT CHANGE UP (ref 10–20)
BUN SERPL-MCNC: 14 MG/DL — SIGNIFICANT CHANGE UP (ref 10–20)
CALCIUM SERPL-MCNC: 7.8 MG/DL — LOW (ref 8.5–10.1)
CALCIUM SERPL-MCNC: 8 MG/DL — LOW (ref 8.5–10.1)
CHLORIDE SERPL-SCNC: 100 MMOL/L — SIGNIFICANT CHANGE UP (ref 98–110)
CHLORIDE SERPL-SCNC: 100 MMOL/L — SIGNIFICANT CHANGE UP (ref 98–110)
CO2 SERPL-SCNC: 25 MMOL/L — SIGNIFICANT CHANGE UP (ref 17–32)
CO2 SERPL-SCNC: 27 MMOL/L — SIGNIFICANT CHANGE UP (ref 17–32)
CREAT SERPL-MCNC: 0.6 MG/DL — LOW (ref 0.7–1.5)
CREAT SERPL-MCNC: 0.6 MG/DL — LOW (ref 0.7–1.5)
EOSINOPHIL # BLD AUTO: 0.13 K/UL — SIGNIFICANT CHANGE UP (ref 0–0.7)
EOSINOPHIL # BLD AUTO: 0.24 K/UL — SIGNIFICANT CHANGE UP (ref 0–0.7)
EOSINOPHIL NFR BLD AUTO: 1.2 % — SIGNIFICANT CHANGE UP (ref 0–8)
EOSINOPHIL NFR BLD AUTO: 1.9 % — SIGNIFICANT CHANGE UP (ref 0–8)
GLUCOSE SERPL-MCNC: 83 MG/DL — SIGNIFICANT CHANGE UP (ref 70–99)
GLUCOSE SERPL-MCNC: 83 MG/DL — SIGNIFICANT CHANGE UP (ref 70–99)
HCT VFR BLD CALC: 23.4 % — LOW (ref 42–52)
HCT VFR BLD CALC: 26.3 % — LOW (ref 42–52)
HGB BLD-MCNC: 7.6 G/DL — LOW (ref 14–18)
HGB BLD-MCNC: 8.6 G/DL — LOW (ref 14–18)
IMM GRANULOCYTES NFR BLD AUTO: 1.3 % — HIGH (ref 0.1–0.3)
IMM GRANULOCYTES NFR BLD AUTO: 1.5 % — HIGH (ref 0.1–0.3)
IRON SATN MFR SERPL: 19 % — SIGNIFICANT CHANGE UP (ref 15–50)
IRON SATN MFR SERPL: 45 UG/DL — SIGNIFICANT CHANGE UP (ref 35–150)
LYMPHOCYTES # BLD AUTO: 1.55 K/UL — SIGNIFICANT CHANGE UP (ref 1.2–3.4)
LYMPHOCYTES # BLD AUTO: 1.98 K/UL — SIGNIFICANT CHANGE UP (ref 1.2–3.4)
LYMPHOCYTES # BLD AUTO: 13.9 % — LOW (ref 20.5–51.1)
LYMPHOCYTES # BLD AUTO: 15.3 % — LOW (ref 20.5–51.1)
MAGNESIUM SERPL-MCNC: 2.2 MG/DL — SIGNIFICANT CHANGE UP (ref 1.8–2.4)
MCHC RBC-ENTMCNC: 28.5 PG — SIGNIFICANT CHANGE UP (ref 27–31)
MCHC RBC-ENTMCNC: 28.8 PG — SIGNIFICANT CHANGE UP (ref 27–31)
MCHC RBC-ENTMCNC: 32.5 G/DL — SIGNIFICANT CHANGE UP (ref 32–37)
MCHC RBC-ENTMCNC: 32.7 G/DL — SIGNIFICANT CHANGE UP (ref 32–37)
MCV RBC AUTO: 87.6 FL — SIGNIFICANT CHANGE UP (ref 80–94)
MCV RBC AUTO: 88 FL — SIGNIFICANT CHANGE UP (ref 80–94)
MONOCYTES # BLD AUTO: 0.94 K/UL — HIGH (ref 0.1–0.6)
MONOCYTES # BLD AUTO: 0.95 K/UL — HIGH (ref 0.1–0.6)
MONOCYTES NFR BLD AUTO: 7.3 % — SIGNIFICANT CHANGE UP (ref 1.7–9.3)
MONOCYTES NFR BLD AUTO: 8.5 % — SIGNIFICANT CHANGE UP (ref 1.7–9.3)
NEUTROPHILS # BLD AUTO: 8.36 K/UL — HIGH (ref 1.4–6.5)
NEUTROPHILS # BLD AUTO: 9.51 K/UL — HIGH (ref 1.4–6.5)
NEUTROPHILS NFR BLD AUTO: 73.5 % — SIGNIFICANT CHANGE UP (ref 42.2–75.2)
NEUTROPHILS NFR BLD AUTO: 74.7 % — SIGNIFICANT CHANGE UP (ref 42.2–75.2)
NRBC # BLD: 0 /100 WBCS — SIGNIFICANT CHANGE UP (ref 0–0)
NRBC # BLD: 0 /100 WBCS — SIGNIFICANT CHANGE UP (ref 0–0)
PLATELET # BLD AUTO: 345 K/UL — SIGNIFICANT CHANGE UP (ref 130–400)
PLATELET # BLD AUTO: 375 K/UL — SIGNIFICANT CHANGE UP (ref 130–400)
POTASSIUM SERPL-MCNC: 4 MMOL/L — SIGNIFICANT CHANGE UP (ref 3.5–5)
POTASSIUM SERPL-MCNC: 4 MMOL/L — SIGNIFICANT CHANGE UP (ref 3.5–5)
POTASSIUM SERPL-SCNC: 4 MMOL/L — SIGNIFICANT CHANGE UP (ref 3.5–5)
POTASSIUM SERPL-SCNC: 4 MMOL/L — SIGNIFICANT CHANGE UP (ref 3.5–5)
PROT SERPL-MCNC: 6.2 G/DL — SIGNIFICANT CHANGE UP (ref 6–8)
RBC # BLD: 2.67 M/UL — LOW (ref 4.7–6.1)
RBC # BLD: 2.99 M/UL — LOW (ref 4.7–6.1)
RBC # FLD: 13.6 % — SIGNIFICANT CHANGE UP (ref 11.5–14.5)
RBC # FLD: 13.7 % — SIGNIFICANT CHANGE UP (ref 11.5–14.5)
SODIUM SERPL-SCNC: 140 MMOL/L — SIGNIFICANT CHANGE UP (ref 135–146)
SODIUM SERPL-SCNC: 142 MMOL/L — SIGNIFICANT CHANGE UP (ref 135–146)
TIBC SERPL-MCNC: 231 UG/DL — SIGNIFICANT CHANGE UP (ref 220–430)
TRANSFERRIN SERPL-MCNC: 193 MG/DL — LOW (ref 200–360)
UIBC SERPL-MCNC: 186 UG/DL — SIGNIFICANT CHANGE UP (ref 110–370)
WBC # BLD: 11.18 K/UL — HIGH (ref 4.8–10.8)
WBC # BLD: 12.93 K/UL — HIGH (ref 4.8–10.8)
WBC # FLD AUTO: 11.18 K/UL — HIGH (ref 4.8–10.8)
WBC # FLD AUTO: 12.93 K/UL — HIGH (ref 4.8–10.8)

## 2019-08-12 PROCEDURE — 93306 TTE W/DOPPLER COMPLETE: CPT | Mod: 26

## 2019-08-12 PROCEDURE — 99233 SBSQ HOSP IP/OBS HIGH 50: CPT

## 2019-08-12 RX ORDER — MULTIVIT-MIN/FERROUS GLUCONATE 9 MG/15 ML
1 LIQUID (ML) ORAL DAILY
Refills: 0 | Status: DISCONTINUED | OUTPATIENT
Start: 2019-08-12 | End: 2019-08-13

## 2019-08-12 RX ORDER — OXYCODONE HYDROCHLORIDE 5 MG/1
10 TABLET ORAL EVERY 6 HOURS
Refills: 0 | Status: DISCONTINUED | OUTPATIENT
Start: 2019-08-12 | End: 2019-08-13

## 2019-08-12 RX ORDER — FERROUS SULFATE 325(65) MG
325 TABLET ORAL DAILY
Refills: 0 | Status: DISCONTINUED | OUTPATIENT
Start: 2019-08-12 | End: 2019-08-13

## 2019-08-12 RX ORDER — MORPHINE SULFATE 50 MG/1
4 CAPSULE, EXTENDED RELEASE ORAL ONCE
Refills: 0 | Status: DISCONTINUED | OUTPATIENT
Start: 2019-08-12 | End: 2019-08-12

## 2019-08-12 RX ADMIN — MORPHINE SULFATE 4 MILLIGRAM(S): 50 CAPSULE, EXTENDED RELEASE ORAL at 02:30

## 2019-08-12 RX ADMIN — OXYCODONE HYDROCHLORIDE 10 MILLIGRAM(S): 5 TABLET ORAL at 21:45

## 2019-08-12 RX ADMIN — Medication 166.67 MILLIGRAM(S): at 18:01

## 2019-08-12 RX ADMIN — GABAPENTIN 300 MILLIGRAM(S): 400 CAPSULE ORAL at 21:46

## 2019-08-12 RX ADMIN — CEFEPIME 100 MILLIGRAM(S): 1 INJECTION, POWDER, FOR SOLUTION INTRAMUSCULAR; INTRAVENOUS at 14:01

## 2019-08-12 RX ADMIN — OXYCODONE HYDROCHLORIDE 10 MILLIGRAM(S): 5 TABLET ORAL at 23:01

## 2019-08-12 RX ADMIN — OXYCODONE HYDROCHLORIDE 10 MILLIGRAM(S): 5 TABLET ORAL at 15:42

## 2019-08-12 RX ADMIN — Medication 100 MILLIGRAM(S): at 18:01

## 2019-08-12 RX ADMIN — MORPHINE SULFATE 4 MILLIGRAM(S): 50 CAPSULE, EXTENDED RELEASE ORAL at 02:04

## 2019-08-12 RX ADMIN — SENNA PLUS 2 TABLET(S): 8.6 TABLET ORAL at 21:46

## 2019-08-12 RX ADMIN — Medication 325 MILLIGRAM(S): at 21:46

## 2019-08-12 RX ADMIN — MORPHINE SULFATE 4 MILLIGRAM(S): 50 CAPSULE, EXTENDED RELEASE ORAL at 07:36

## 2019-08-12 RX ADMIN — CEFEPIME 100 MILLIGRAM(S): 1 INJECTION, POWDER, FOR SOLUTION INTRAMUSCULAR; INTRAVENOUS at 06:05

## 2019-08-12 RX ADMIN — Medication 166.67 MILLIGRAM(S): at 06:34

## 2019-08-12 RX ADMIN — OXYCODONE HYDROCHLORIDE 5 MILLIGRAM(S): 5 TABLET ORAL at 04:31

## 2019-08-12 RX ADMIN — Medication 1 TABLET(S): at 21:46

## 2019-08-12 RX ADMIN — OXYCODONE HYDROCHLORIDE 10 MILLIGRAM(S): 5 TABLET ORAL at 10:16

## 2019-08-12 RX ADMIN — GABAPENTIN 300 MILLIGRAM(S): 400 CAPSULE ORAL at 14:00

## 2019-08-12 RX ADMIN — Medication 1 APPLICATION(S): at 14:01

## 2019-08-12 RX ADMIN — Medication 100 MILLIGRAM(S): at 06:06

## 2019-08-12 RX ADMIN — GABAPENTIN 300 MILLIGRAM(S): 400 CAPSULE ORAL at 06:06

## 2019-08-12 RX ADMIN — CEFEPIME 100 MILLIGRAM(S): 1 INJECTION, POWDER, FOR SOLUTION INTRAMUSCULAR; INTRAVENOUS at 21:43
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
## 2019-08-12 NOTE — CONSULT NOTE ADULT - SUBJECTIVE AND OBJECTIVE BOX
RODOLFO LOCO  23y, Male  Allergy: ibuprofen (Anaphylaxis)      CHIEF COMPLAINT: fever; elevated WBC (12 Aug 2019 10:59)      HPI:  23 y.o. M with no PMH presented to the ED two days ago due to fever of 101.6 F for the past 5 days along with generalized weakness. 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 He was taken to the ED at [facility] and found to have a comminuted fracture of the R scapula, and R thigh hematoma. On  the patient developed compartment syndrome of the R hip and underwent fasciotomy. On  the patient was discharged from the hospital, but the next day he had an episode of chest tightness and SOB, so he came to the ED at [facility], where he had WBC of 9.87, Hgb/Hct 8/23.8, D-dimer 462, and CTA chest was negative for PE. The patient was then discharged home.    On  the patient had low-grade fever at night, which resolved with Tylenol. On 08/10 the patient had fever of 101.6 F along with generalized weakness. He went to urgent care, where he had an elevated WBC count and Hgb 8.7; he was referred to the ED. He then came to ED at [facility] where he had temp 99, , /65, RR 19, WBC 18.19, Hgb/Hct 8.4/25.6, and platelets 437. The patient was admitted for sepsis due to unknown source, and started on vancomycin and cefepime. His urine culture was negative, CXR negative, CT abdomen/pelvis with IV contrast showed mild inflammatory changes to R subcutaneous tissue, correlate with cellulitis, no abscess. TTE was normal. Duplex ultrasound of R leg was negative for DVT.    Parents were also present at bedside. The mother states that the patient's older brother had common cold a few days ago.   FAMILY HISTORY:    PAST MEDICAL & SURGICAL HISTORY:  Compartment syndrome  Scapular fracture  No pertinent past medical history  H/O fasciotomy      SOCIAL HISTORY    Substance Use ( x ) never used  (  ) IVDU (  ) Other:  Tobacco Usage:  ( x  ) never smoked   (   ) former smoker   (   ) current smoker   Alcohol Usage: (   ) social  (   ) daily use ( x  ) denies  Sexual History:       ROS  General: sweating upon awakening, denies rigors, nightsweats  HEENT: Denies headache, rhinorrhea, sore throat, eye pain  CV: Denies CP, palpitations  PULM: Denies SOB, wheezing  GI: Denies abdominal pain, hematochezia/melena  : Denies dysuria, hematuria  MSK: complains of R shoulder pain, Denies arthralgias, myalgias in other areas of the body  SKIN: Denies rash, lesions  NEURO: Denies paresthesias, weakness  PSYCH: Denies depression, anxiety    VITALS:  T(F): 97.6, Max: 99.3 (19 @ 15:04)  HR: 89  BP: 126/72  RR: 18Vital Signs Last 24 Hrs  T(C): 36.4 (12 Aug 2019 05:36), Max: 37.4 (11 Aug 2019 15:04)  T(F): 97.6 (12 Aug 2019 05:36), Max: 99.3 (11 Aug 2019 15:04)  HR: 89 (12 Aug 2019 05:36) (89 - 95)  BP: 126/72 (12 Aug 2019 05:36) (126/72 - 139/68)  BP(mean): --  RR: 18 (12 Aug 2019 05:36) (18 - 18)  SpO2: --    PHYSICAL EXAM:  Gen: NAD, resting in bed  HEENT: Normocephalic, atraumatic  Neck: supple, no lymphadenopathy  CV: Regular rate & regular rhythm  Lungs: decreased BS at bases  Abdomen: Soft, BS present  Ext: Warm, well perfused  Neuro: non focal, awake  Skin: R arm sling, R buttock drain with serosanguinous drainage.    TESTS & MEASUREMENTS:                        8.4    18.19 )-----------( 437      ( 10 Aug 2019 14:45 )             25.6     08-10    140  |  97<L>  |  17  ----------------------------<  77  3.8   |  28  |  0.7    Ca    7.8<L>      10 Aug 2019 14:45  Mg     2.1     08-11    TPro  6.9  /  Alb  4.1  /  TBili  1.0  /  DBili  x   /  AST  31  /  ALT  87<H>  /  AlkPhos  85  08-10      LIVER FUNCTIONS - ( 10 Aug 2019 14:45 )  Alb: 4.1 g/dL / Pro: 6.9 g/dL / ALK PHOS: 85 U/L / ALT: 87 U/L / AST: 31 U/L / GGT: x           Urinalysis Basic - ( 10 Aug 2019 14:09 )    Color: Yellow / Appearance: Clear / S.024 / pH: x  Gluc: x / Ketone: Negative  / Bili: Negative / Urobili: 6 mg/dL   Blood: x / Protein: Trace / Nitrite: Negative   Leuk Esterase: Negative / RBC: x / WBC x   Sq Epi: x / Non Sq Epi: x / Bacteria: x        Culture - Blood (collected 08-10-19 @ 14:45)  Source: .Blood Blood  Preliminary Report (19 @ 23:01):    No growth to date.    Culture - Blood (collected 08-10-19 @ 14:45)  Source: .Blood Blood  Preliminary Report (19 @ 23:01):    No growth to date.    Culture - Urine (collected 08-10-19 @ 14:09)  Source: .Urine Clean Catch (Midstream)  Final Report (19 @ 18:02):    No growth        Lactate, Blood: 1.3 mmol/L (08-10-19 @ 14:45)      INFECTIOUS DISEASES TESTING  Rapid HIV-1/2 Antibody: Nonreact (19 @ 18:57)  MRSA PCR Result.: Negative (19 @ 10:58)  Influenza A and B: Negative  RSV: Negative  RVP: Negative  QuantiFERON: pending      RADIOLOGY & ADDITIONAL TESTS:  I have personally reviewed the last Chest xray  CXR  Xray Chest 1 View- PORTABLE-Urgent:   EXAM:  XR CHEST PORTABLE URGENT 1V            PROCEDURE DATE:  08/10/2019            INTERPRETATION:  Clinical History/Reason For Exam: fever    Comparison : Chest radiograph 2019.    Technique: XR CHEST URGENT    Findings:    Support devices: None.    Cardiac/mediastinum/hilum: Unremarkable.    Lung parenchyma/Pleura: Within normal limits.    Skeleton/soft tissues: Unremarkable.    Impression:      No radiographic evidence of acute cardiopulmonary disease.                      BOGDAN CAPONE M.D., ATTENDING RADIOLOGIST  This document has been electronically signed. Aug 11 2019  1:23PM             (08-10-19 @ 15:42)      CT  CT Abdomen and Pelvis w/ IV Cont:   EXAM:  CT ABDOMEN AND PELVIS IC            PROCEDURE DATE:  08/10/2019            INTERPRETATION:  CLINICAL STATEMENT: Abdominal pain. Trauma to pelvis.   Fever.    TECHNIQUE: Contiguous axial CT images were obtained from the lower chest   to the pubic symphysis following administration of 100cc Optiray 320   intravenous contrast.  Oral contrast was not administered.  Reformatted   images in the coronal and sagittal planes were acquired.    Comparison made with CT chest 2019.    FINDINGS:    LOWER CHEST: Unremarkable.    HEPATOBILIARY: Unremarkable.    SPLEEN: Unremarkable.    PANCREAS: Unremarkable.    ADRENAL GLANDS: Unremarkable.    KIDNEYS: Unremarkable.    ABDOMINOPELVIC NODES: Unremarkable.    PELVIC ORGANS: Unremarkable.    PERITONEUM/MESENTERY/BOWEL: Unremarkable.    BONES/SOFT TISSUES: Partially imaged right scapular fracture. Mild   inflammatory change to right gluteal subcutaneous tissues with drainage   catheter; no evidence of abscess.      IMPRESSION:   1. Mild inflammatory change to right gluteal subcutaneous tissues with   drainage catheter, correlate for cellulitis; no evidence of abscess.  2. Partially imaged right scapular fracture.                  ANDREA NAPIER M.D., ATTENDING RADIOLOGIST  This document has been electronically signed. Aug 10 2019  9:40PM             (08-10-19 @ 21:35)      CARDIOLOGY TESTING  Transthoracic Echocardiogram:    EXAM:  2-D ECHO (TTE) COMPLETE        PROCEDURE DATE:  2019      INTERPRETATION:  REPORT:    TRANSTHORACIC ECHOCARDIOGRAM REPORT         Patient Name:   RODOLFO LOCO Accession #: [id number]  Medical Rec #:  [id number]      Height:      66.0 in 167.6 cm  YOB: 1995    Weight:      185.0 lb 83.92 kg  Patient Age:    23 years      BSA:         1.93 m²  Patient Gender: M             BP:          127/72 mmHg       Date of Exam:        2019 8:51:02 AM  Referring Physician: [id number] ED UNASSIGNED  Sonographer:         JUICE  Reading Physician:   Kendra Jimenez M.D.    Procedure: 2D Echo/Doppler/Color Doppler Complete.  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 Left ventricular ejection fraction, by visual estimation, is 65 to   70%.   2. Normal global left ventricular systolic function.   3. No definitive evidence of valvular vegetation.    PHYSICIAN INTERPRETATION:  Left Ventricle: The left ventricular internal cavity size isnormal. Left   ventricular wall thickness is normal. Global LV systolic function was   normal. Left ventricular ejection fraction, by visual estimation, is 65   to 70%. Spectral Doppler shows normal pattern of LV diastolic filling.  Right Ventricle: Normal right ventricular size and function.  Left Atrium: Normal left atrial size.  Right Atrium: Normal right atrial size.  Pericardium: There is no evidence of pericardial effusion.  Mitral Valve: The mitral valve is normal in structure. No mitral   regurgitation.  Tricuspid Valve: The tricuspid valve is grossly normal in structure. No   tricuspid regurgitation visualized.  Aortic Valve: The aortic valve was not well visualized. The aortic valve   is trileaflet. No evidence of aortic stenosis. No aortic regurgitation.  Pulmonic Valve: The pulmonic valve was not well visualized.  Aorta: Aortic root measured at sinotubular junction is normal.  Venous: The inferior vena cava was normal sized, with respiratory size   variation greater than 50%.       2D AND M-MODE MEASUREMENTS (normal ranges within parentheses):  Left                  Normal   Aorta/Left             Normal  Ventricle:                     Atrium:  IVSd (2D):  0.61 cm  (0.7-1.1) AoV Cusp       1.40  (1.5-2.6)  LVPWd       0.82cm  (0.7-1.1) Separation:     cm  (2D):                          Left Atrium    3.63  (1.9-4.0)  LVIDd       5.18 cm  (3.4-5.7) (Mmode):        cm  (2D):                          LA Volume      10.4  LVIDs       3.12 cm            Index         ml/m²  (2D):                          Right  LV FS       39.7 %    (>25%)   Ventricle:  (2D):                          RVd (Mmode):   2.82 cm  IVSd        0.84 cm  (0.7-1.1) RVd (2D):      2.40 cm  (Mmode):  LVPWd       0.93 cm  (0.7-1.1)  (Mmode):  LVIDd     4.62 cm  (3.4-5.7)  (Mmode):  LVIDs       2.52 cm  (Mmode):  LV FS       45.5 %    (>25%)  (Mmode):  Relative     0.32     (<0.42)  Wall  Thickness  Rel. Wall    0.40     (<0.42)  Thickness  Mm  LV Mass    70.0 g/m²  Index:  Mmode    SPECTRAL DOPPLER ANALYSIS:  LV DIASTOLIC FUNCTION:  MV Peak E: 0.88 m/s Decel Time: 234 msec  MV Peak A: 0.39 m/s  E/A Ratio: 2.28    Aortic Valve:  AoV VMax:    1.18 m/s AoV Area, Vmax: 2.46 cm² Vmax Indx: 1.27 cm²/m²  AoV Pk Grad: 5.6 mmHg    LVOT Vmax: 0.85 m/s  LVOT VTI:  0.16 m  LVOT Diam: 2.09 cm    Mitral Valve:  MV P1/2 Time: 67.89 msec  MV Area, PHT: 3.24 cm²    Tricuspid Valve and PA/RV Systolic Pressure: TR Max Velocity: 2.20 m/s RA   Pressure: 3 mmHg RVSP/PASP: 22.4 mmHg    Pulmonic Valve:  PV Max Velocity: 1.00 m/s PV Max P.0 mmHg PV Mean PG:       [id number] Kendra Jimenez M.D., Electronically signed on 2019 at   9:55:27 AM              *** Final ***                    KENDRA JIMENEZ M.D., ATTENDING CARDIOLOGIST  This document has been electronically signed. Aug 12 2019  8:51AM             (19 @ 08:51)  12 Lead ECG:   Ventricular Rate 100 BPM    Atrial Rate 100 BPM    P-R Interval 134 ms    QRS Duration 80 ms    Q-T Interval 348 ms    QTC Calculation(Bezet) 448 ms    P Axis 25 degrees    R Axis 17 degrees    T Axis 1 degrees    Diagnosis Line Normal sinus rhythm  Nonspecific T wave abnormality  Abnormal ECG    Confirmed by Raghu Tapia ([id number]) on 2019 7:47:03 AM (08-10-19 @ 14:59)      All available historical records have been reviewed    MEDICATIONS  cefepime   IVPB 1000  docusate sodium 100  enoxaparin Injectable 40  gabapentin 300  hydrocortisone 1% Ointment 1  senna 2  vancomycin  IVPB 1250      ANTIBIOTICS:  cefepime   IVPB 1000 milliGRAM(s) IV Intermittent every 8 hours  vancomycin  IVPB 1250 milliGRAM(s) IV Intermittent every 12 hours        ASSESSMENT  23 y.o. M with no PMH is admitted due to sepsis without source, and anemia.      IMPRESSION  #(Severe) Sepsis on admission (T<96.8F, T>101F, Pulse>90, Resp Rate>20, WBC>12, wbc<4, Bands>10%), lactic acidosis, metabolic encephalopathy, ESTRELLA due to suspected Gram negative pneumonia, aspiration pneumonia, pyelonephritis, bacteremia, cellulitis etc.   #  #    RECOMMENDATIONS  - F/U Monospot and QuantiFERON tests.  - C/w vancomycin and cefepime.    Plan not yet discussed with the attending. RODOLFO LOCO  23y, Male  Allergy: ibuprofen (Anaphylaxis)      CHIEF COMPLAINT: fever; elevated WBC (12 Aug 2019 10:59)      HPI:  23 y.o. M with no PMH presented to the ED two days ago due to fever of 101.6 F along with generalized weakness. 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 He was taken to the ED at [facility] and found to have a comminuted fracture of the R scapula, and R thigh hematoma. On  the patient developed compartment syndrome of the R hip and underwent fasciotomy. On  the patient was discharged from the hospital, but the next day he had an episode of chest tightness and SOB, so he came to the ED at [facility], where he had WBC of 9.87, Hgb/Hct 8/23.8, D-dimer 462, and CTA chest was negative for PE. The patient was then discharged home.    On  the patient had low-grade fever at night, which resolved with Tylenol. On 08/10 the patient had a fever of 101.6 F along with generalized weakness. He went to urgent care, where he had an elevated WBC count and Hgb 8.7; he was referred to the ED. He then came to ED at [facility] where he had temp 99, , /65, RR 19, WBC 18.19, Hgb/Hct 8.4/25.6, and platelets 437. The patient was admitted for sepsis due to unknown source, and started on vancomycin and cefepime. His urine culture was negative, CXR negative, CT abdomen/pelvis with IV contrast showed mild inflammatory changes to R subcutaneous tissue, correlate with cellulitis, no abscess. Duplex ultrasound of R leg was negative for DVT. TTE did not reveal any valvular vegetation.     Parents were also present at bedside. The mother states that the patient's older brother had common cold a few days ago.     FAMILY HISTORY:    PAST MEDICAL & SURGICAL HISTORY:  Compartment syndrome  Scapular fracture  No pertinent past medical history  H/O fasciotomy      SOCIAL HISTORY    Substance Use ( x ) never used  (  ) IVDU (  ) Other:  Tobacco Usage:  ( x  ) never smoked   (   ) former smoker   (   ) current smoker   Alcohol Usage: (   ) social  (   ) daily use ( x  ) denies  Sexual History:       ROS  General: denies rigors, nightsweats  HEENT: Denies headache, rhinorrhea, sore throat, eye pain  CV: Denies CP, palpitations  PULM: Denies SOB, wheezing  GI: Denies abdominal pain, hematochezia/melena  : Denies dysuria, hematuria  MSK: complains of R shoulder pain, Denies arthralgias, myalgias in other areas of the body  SKIN: Denies rash, lesions  NEURO: Denies paresthesias, weakness  PSYCH: Denies depression, anxiety    VITALS:  T(F): 97.6, Max: 99.3 (19 @ 15:04)  HR: 89  BP: 126/72  RR: 18Vital Signs Last 24 Hrs  T(C): 36.4 (12 Aug 2019 05:36), Max: 37.4 (11 Aug 2019 15:04)  T(F): 97.6 (12 Aug 2019 05:36), Max: 99.3 (11 Aug 2019 15:04)  HR: 89 (12 Aug 2019 05:36) (89 - 95)  BP: 126/72 (12 Aug 2019 05:36) (126/72 - 139/68)  BP(mean): --  RR: 18 (12 Aug 2019 05:36) (18 - 18)  SpO2: --    PHYSICAL EXAM:  Gen: NAD, resting in bed  HEENT: Normocephalic, atraumatic  Neck: supple, no lymphadenopathy  CV: Regular rate & regular rhythm  Lungs: decreased BS at bases  Abdomen: Soft, BS present  Ext: Warm, well perfused  Neuro: non focal, awake  Skin: R arm sling, R buttock drain with serosanguinous drainage.    TESTS & MEASUREMENTS:                        8.4    18.19 )-----------( 437      ( 10 Aug 2019 14:45 )             25.6     08-10    140  |  97<L>  |  17  ----------------------------<  77  3.8   |  28  |  0.7    Ca    7.8<L>      10 Aug 2019 14:45  Mg     2.1     08-11    TPro  6.9  /  Alb  4.1  /  TBili  1.0  /  DBili  x   /  AST  31  /  ALT  87<H>  /  AlkPhos  85  08-10      LIVER FUNCTIONS - ( 10 Aug 2019 14:45 )  Alb: 4.1 g/dL / Pro: 6.9 g/dL / ALK PHOS: 85 U/L / ALT: 87 U/L / AST: 31 U/L / GGT: x           Urinalysis Basic - ( 10 Aug 2019 14:09 )    Color: Yellow / Appearance: Clear / S.024 / pH: x  Gluc: x / Ketone: Negative  / Bili: Negative / Urobili: 6 mg/dL   Blood: x / Protein: Trace / Nitrite: Negative   Leuk Esterase: Negative / RBC: x / WBC x   Sq Epi: x / Non Sq Epi: x / Bacteria: x        Culture - Blood (collected 08-10-19 @ 14:45)  Source: .Blood Blood  Preliminary Report (19 @ 23:01):    No growth to date.    Culture - Blood (collected 08-10-19 @ 14:45)  Source: .Blood Blood  Preliminary Report (19 @ 23:01):    No growth to date.    Culture - Urine (collected 08-10-19 @ 14:09)  Source: .Urine Clean Catch (Midstream)  Final Report (19 @ 18:02):    No growth        Lactate, Blood: 1.3 mmol/L (08-10-19 @ 14:45)      INFECTIOUS DISEASES TESTING  Rapid HIV-1/2 Antibody: Nonreact (19 @ 18:57)  MRSA PCR Result.: Negative (19 @ 10:58)  Influenza A and B: Negative  RSV: Negative  RVP: Negative  QuantiFERON: pending      RADIOLOGY & ADDITIONAL TESTS:  I have personally reviewed the last Chest xray  CXR  Xray Chest 1 View- PORTABLE-Urgent:   EXAM:  XR CHEST PORTABLE URGENT 1V            PROCEDURE DATE:  08/10/2019            INTERPRETATION:  Clinical History/Reason For Exam: fever    Comparison : Chest radiograph 2019.    Technique: XR CHEST URGENT    Findings:    Support devices: None.    Cardiac/mediastinum/hilum: Unremarkable.    Lung parenchyma/Pleura: Within normal limits.    Skeleton/soft tissues: Unremarkable.    Impression:      No radiographic evidence of acute cardiopulmonary disease.                      BOGDAN CAPONE M.D., ATTENDING RADIOLOGIST  This document has been electronically signed. Aug 11 2019  1:23PM             (08-10-19 @ 15:42)      CT  CT Abdomen and Pelvis w/ IV Cont:   EXAM:  CT ABDOMEN AND PELVIS IC            PROCEDURE DATE:  08/10/2019            INTERPRETATION:  CLINICAL STATEMENT: Abdominal pain. Trauma to pelvis.   Fever.    TECHNIQUE: Contiguous axial CT images were obtained from the lower chest   to the pubic symphysis following administration of 100cc Optiray 320   intravenous contrast.  Oral contrast was not administered.  Reformatted   images in the coronal and sagittal planes were acquired.    Comparison made with CT chest 2019.    FINDINGS:    LOWER CHEST: Unremarkable.    HEPATOBILIARY: Unremarkable.    SPLEEN: Unremarkable.    PANCREAS: Unremarkable.    ADRENAL GLANDS: Unremarkable.    KIDNEYS: Unremarkable.    ABDOMINOPELVIC NODES: Unremarkable.    PELVIC ORGANS: Unremarkable.    PERITONEUM/MESENTERY/BOWEL: Unremarkable.    BONES/SOFT TISSUES: Partially imaged right scapular fracture. Mild   inflammatory change to right gluteal subcutaneous tissues with drainage   catheter; no evidence of abscess.      IMPRESSION:   1. Mild inflammatory change to right gluteal subcutaneous tissues with   drainage catheter, correlate for cellulitis; no evidence of abscess.  2. Partially imaged right scapular fracture.                  ANDREA NAPIER M.D., ATTENDING RADIOLOGIST  This document has been electronically signed. Aug 10 2019  9:40PM             (08-10-19 @ 21:35)      CARDIOLOGY TESTING  Transthoracic Echocardiogram:    EXAM:  2-D ECHO (TTE) COMPLETE        PROCEDURE DATE:  2019      INTERPRETATION:  REPORT:    TRANSTHORACIC ECHOCARDIOGRAM REPORT         Patient Name:   RODOLFO LOCO Accession #: [id number]  Medical Rec #:  [id number]      Height:      66.0 in 167.6 cm  YOB: 1995    Weight:      185.0 lb 83.92 kg  Patient Age:    23 years      BSA:         1.93 m²  Patient Gender: M             BP:          127/72 mmHg       Date of Exam:        2019 8:51:02 AM  Referring Physician: [id number] ED UNASSIGNED  Sonographer:         JUICE  Reading Physician:   Kendra Jimenez M.D.    Procedure: 2D Echo/Doppler/Color Doppler Complete.  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 Left ventricular ejection fraction, by visual estimation, is 65 to   70%.   2. Normal global left ventricular systolic function.   3. No definitive evidence of valvular vegetation.    PHYSICIAN INTERPRETATION:  Left Ventricle: The left ventricular internal cavity size isnormal. Left   ventricular wall thickness is normal. Global LV systolic function was   normal. Left ventricular ejection fraction, by visual estimation, is 65   to 70%. Spectral Doppler shows normal pattern of LV diastolic filling.  Right Ventricle: Normal right ventricular size and function.  Left Atrium: Normal left atrial size.  Right Atrium: Normal right atrial size.  Pericardium: There is no evidence of pericardial effusion.  Mitral Valve: The mitral valve is normal in structure. No mitral   regurgitation.  Tricuspid Valve: The tricuspid valve is grossly normal in structure. No   tricuspid regurgitation visualized.  Aortic Valve: The aortic valve was not well visualized. The aortic valve   is trileaflet. No evidence of aortic stenosis. No aortic regurgitation.  Pulmonic Valve: The pulmonic valve was not well visualized.  Aorta: Aortic root measured at sinotubular junction is normal.  Venous: The inferior vena cava was normal sized, with respiratory size   variation greater than 50%.       2D AND M-MODE MEASUREMENTS (normal ranges within parentheses):  Left                  Normal   Aorta/Left             Normal  Ventricle:                     Atrium:  IVSd (2D):  0.61 cm  (0.7-1.1) AoV Cusp       1.40  (1.5-2.6)  LVPWd       0.82cm  (0.7-1.1) Separation:     cm  (2D):                          Left Atrium    3.63  (1.9-4.0)  LVIDd       5.18 cm  (3.4-5.7) (Mmode):        cm  (2D):                          LA Volume      10.4  LVIDs       3.12 cm            Index         ml/m²  (2D):                          Right  LV FS       39.7 %    (>25%)   Ventricle:  (2D):                          RVd (Mmode):   2.82 cm  IVSd        0.84 cm  (0.7-1.1) RVd (2D):      2.40 cm  (Mmode):  LVPWd       0.93 cm  (0.7-1.1)  (Mmode):  LVIDd     4.62 cm  (3.4-5.7)  (Mmode):  LVIDs       2.52 cm  (Mmode):  LV FS       45.5 %    (>25%)  (Mmode):  Relative     0.32     (<0.42)  Wall  Thickness  Rel. Wall    0.40     (<0.42)  Thickness  Mm  LV Mass    70.0 g/m²  Index:  Mmode    SPECTRAL DOPPLER ANALYSIS:  LV DIASTOLIC FUNCTION:  MV Peak E: 0.88 m/s Decel Time: 234 msec  MV Peak A: 0.39 m/s  E/A Ratio: 2.28    Aortic Valve:  AoV VMax:    1.18 m/s AoV Area, Vmax: 2.46 cm² Vmax Indx: 1.27 cm²/m²  AoV Pk Grad: 5.6 mmHg    LVOT Vmax: 0.85 m/s  LVOT VTI:  0.16 m  LVOT Diam: 2.09 cm    Mitral Valve:  MV P1/2 Time: 67.89 msec  MV Area, PHT: 3.24 cm²    Tricuspid Valve and PA/RV Systolic Pressure: TR Max Velocity: 2.20 m/s RA   Pressure: 3 mmHg RVSP/PASP: 22.4 mmHg    Pulmonic Valve:  PV Max Velocity: 1.00 m/s PV Max P.0 mmHg PV Mean PG:       [id number] Kendra Jimenez M.D., Electronically signed on 2019 at   9:55:27 AM              *** Final ***                    KENDRA JIMENEZ M.D., ATTENDING CARDIOLOGIST  This document has been electronically signed. Aug 12 2019  8:51AM             (19 @ 08:51)  12 Lead ECG:   Ventricular Rate 100 BPM    Atrial Rate 100 BPM    P-R Interval 134 ms    QRS Duration 80 ms    Q-T Interval 348 ms    QTC Calculation(Bezet) 448 ms    P Axis 25 degrees    R Axis 17 degrees    T Axis 1 degrees    Diagnosis Line Normal sinus rhythm  Nonspecific T wave abnormality  Abnormal ECG    Confirmed by Raghu Tapia ([id number]) on 2019 7:47:03 AM (08-10-19 @ 14:59)      All available historical records have been reviewed    MEDICATIONS  cefepime   IVPB 1000  docusate sodium 100  enoxaparin Injectable 40  gabapentin 300  hydrocortisone 1% Ointment 1  senna 2  vancomycin  IVPB 1250      ANTIBIOTICS:  cefepime   IVPB 1000 milliGRAM(s) IV Intermittent every 8 hours  vancomycin  IVPB 1250 milliGRAM(s) IV Intermittent every 12 hours        ASSESSMENT  23 y.o. M with no PMH is admitted due to sepsis without source, and anemia.      RECOMMENDATIONS    #Sepsis on admission:  - Sepsis is likely due to recent R hip drain from fasciotomy due to compartment syndrome.  - Patient is currently afebrile, not in any acute distress. WBC count today is 11.18.  - C/w vancomycin and cefepime.  - F/U Monospot and QuantiFERON tests. RODOLFO LOCO  23y, Male  Allergy: ibuprofen (Anaphylaxis)      CHIEF COMPLAINT: fever; elevated WBC (12 Aug 2019 10:59)      HPI:  23 y.o. M with no PMH presented to the ED two days ago due to fever of 101.6 F along with generalized weakness. 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 He was taken to the ED at [facility] and found to have a comminuted fracture of the R scapula, and R thigh hematoma. On  the patient developed compartment syndrome of the R hip and underwent fasciotomy. On  the patient was discharged from the hospital, but the next day he had an episode of chest tightness and SOB, so he came to the ED at [facility], where he had WBC of 9.87, Hgb/Hct 8/23.8, D-dimer 462, and CTA chest was negative for PE. The patient was then discharged home.    On  the patient had low-grade fever at night, which resolved with Tylenol. On 08/10 the patient had a fever of 101.6 F along with generalized weakness. He went to urgent care, where he had an elevated WBC count and Hgb 8.7; he was referred to the ED. He then came to ED at [facility] where he had temp 99, , /65, RR 19, WBC 18.19, Hgb/Hct 8.4/25.6, and platelets 437. The patient was admitted for sepsis due to unknown source, and started on vancomycin and cefepime. His urine culture was negative, CXR negative, CT abdomen/pelvis with IV contrast showed mild inflammatory changes to R subcutaneous tissue, correlate with cellulitis, no abscess. Duplex ultrasound of R leg was negative for DVT. TTE did not reveal any valvular vegetation.     Parents were also present at bedside. The mother states that the patient's older brother had common cold a few days ago.     FAMILY HISTORY:    PAST MEDICAL & SURGICAL HISTORY:  Compartment syndrome  Scapular fracture  No pertinent past medical history  H/O fasciotomy      SOCIAL HISTORY    Substance Use ( x ) never used  (  ) IVDU (  ) Other:  Tobacco Usage:  ( x  ) never smoked   (   ) former smoker   (   ) current smoker   Alcohol Usage: (   ) social  (   ) daily use ( x  ) denies  Sexual History:       ROS  General: denies rigors, nightsweats  HEENT: Denies headache, rhinorrhea, sore throat, eye pain  CV: Denies CP, palpitations  PULM: Denies SOB, wheezing  GI: Denies abdominal pain, hematochezia/melena  : Denies dysuria, hematuria  MSK: complains of R shoulder pain, Denies arthralgias, myalgias in other areas of the body  SKIN: Denies rash, lesions  NEURO: Denies paresthesias, weakness  PSYCH: Denies depression, anxiety    VITALS:  T(F): 97.6, Max: 99.3 (19 @ 15:04)  HR: 89  BP: 126/72  RR: 18Vital Signs Last 24 Hrs  T(C): 36.4 (12 Aug 2019 05:36), Max: 37.4 (11 Aug 2019 15:04)  T(F): 97.6 (12 Aug 2019 05:36), Max: 99.3 (11 Aug 2019 15:04)  HR: 89 (12 Aug 2019 05:36) (89 - 95)  BP: 126/72 (12 Aug 2019 05:36) (126/72 - 139/68)  BP(mean): --  RR: 18 (12 Aug 2019 05:36) (18 - 18)  SpO2: --    PHYSICAL EXAM:  Gen: NAD, resting in bed  HEENT: Normocephalic, atraumatic  Neck: supple, no lymphadenopathy  CV: Regular rate & regular rhythm  Lungs: decreased BS at bases  Abdomen: Soft, BS present  Ext: Warm, well perfused  Neuro: non focal, awake  Skin: R arm sling, R buttock drain with serosanguinous drainage.    TESTS & MEASUREMENTS:                        8.4    18.19 )-----------( 437      ( 10 Aug 2019 14:45 )             25.6     08-10    140  |  97<L>  |  17  ----------------------------<  77  3.8   |  28  |  0.7    Ca    7.8<L>      10 Aug 2019 14:45  Mg     2.1     08-11    TPro  6.9  /  Alb  4.1  /  TBili  1.0  /  DBili  x   /  AST  31  /  ALT  87<H>  /  AlkPhos  85  08-10      LIVER FUNCTIONS - ( 10 Aug 2019 14:45 )  Alb: 4.1 g/dL / Pro: 6.9 g/dL / ALK PHOS: 85 U/L / ALT: 87 U/L / AST: 31 U/L / GGT: x           Urinalysis Basic - ( 10 Aug 2019 14:09 )    Color: Yellow / Appearance: Clear / S.024 / pH: x  Gluc: x / Ketone: Negative  / Bili: Negative / Urobili: 6 mg/dL   Blood: x / Protein: Trace / Nitrite: Negative   Leuk Esterase: Negative / RBC: x / WBC x   Sq Epi: x / Non Sq Epi: x / Bacteria: x        Culture - Blood (collected 08-10-19 @ 14:45)  Source: .Blood Blood  Preliminary Report (19 @ 23:01):    No growth to date.    Culture - Blood (collected 08-10-19 @ 14:45)  Source: .Blood Blood  Preliminary Report (19 @ 23:01):    No growth to date.    Culture - Urine (collected 08-10-19 @ 14:09)  Source: .Urine Clean Catch (Midstream)  Final Report (19 @ 18:02):    No growth        Lactate, Blood: 1.3 mmol/L (08-10-19 @ 14:45)      INFECTIOUS DISEASES TESTING  Rapid HIV-1/2 Antibody: Nonreact (19 @ 18:57)  MRSA PCR Result.: Negative (19 @ 10:58)  Influenza A and B: Negative  RSV: Negative  RVP: Negative  QuantiFERON: pending      RADIOLOGY & ADDITIONAL TESTS:  I have personally reviewed the last Chest xray  CXR  Xray Chest 1 View- PORTABLE-Urgent:   EXAM:  XR CHEST PORTABLE URGENT 1V            PROCEDURE DATE:  08/10/2019            INTERPRETATION:  Clinical History/Reason For Exam: fever    Comparison : Chest radiograph 2019.    Technique: XR CHEST URGENT    Findings:    Support devices: None.    Cardiac/mediastinum/hilum: Unremarkable.    Lung parenchyma/Pleura: Within normal limits.    Skeleton/soft tissues: Unremarkable.    Impression:      No radiographic evidence of acute cardiopulmonary disease.                      BOGDAN CAPONE M.D., ATTENDING RADIOLOGIST  This document has been electronically signed. Aug 11 2019  1:23PM             (08-10-19 @ 15:42)      CT  CT Abdomen and Pelvis w/ IV Cont:   EXAM:  CT ABDOMEN AND PELVIS IC            PROCEDURE DATE:  08/10/2019            INTERPRETATION:  CLINICAL STATEMENT: Abdominal pain. Trauma to pelvis.   Fever.    TECHNIQUE: Contiguous axial CT images were obtained from the lower chest   to the pubic symphysis following administration of 100cc Optiray 320   intravenous contrast.  Oral contrast was not administered.  Reformatted   images in the coronal and sagittal planes were acquired.    Comparison made with CT chest 2019.    FINDINGS:    LOWER CHEST: Unremarkable.    HEPATOBILIARY: Unremarkable.    SPLEEN: Unremarkable.    PANCREAS: Unremarkable.    ADRENAL GLANDS: Unremarkable.    KIDNEYS: Unremarkable.    ABDOMINOPELVIC NODES: Unremarkable.    PELVIC ORGANS: Unremarkable.    PERITONEUM/MESENTERY/BOWEL: Unremarkable.    BONES/SOFT TISSUES: Partially imaged right scapular fracture. Mild   inflammatory change to right gluteal subcutaneous tissues with drainage   catheter; no evidence of abscess.      IMPRESSION:   1. Mild inflammatory change to right gluteal subcutaneous tissues with   drainage catheter, correlate for cellulitis; no evidence of abscess.  2. Partially imaged right scapular fracture.                  ANDREA NAPIER M.D., ATTENDING RADIOLOGIST  This document has been electronically signed. Aug 10 2019  9:40PM             (08-10-19 @ 21:35)      CARDIOLOGY TESTING  Transthoracic Echocardiogram:    EXAM:  2-D ECHO (TTE) COMPLETE        PROCEDURE DATE:  2019      INTERPRETATION:  REPORT:    TRANSTHORACIC ECHOCARDIOGRAM REPORT         Patient Name:   RODOLFO LOCO Accession #: [id number]  Medical Rec #:  [id number]      Height:      66.0 in 167.6 cm  YOB: 1995    Weight:      185.0 lb 83.92 kg  Patient Age:    23 years      BSA:         1.93 m²  Patient Gender: M             BP:          127/72 mmHg       Date of Exam:        2019 8:51:02 AM  Referring Physician: [id number] ED UNASSIGNED  Sonographer:         JUICE  Reading Physician:   Kendra Jimenez M.D.    Procedure: 2D Echo/Doppler/Color Doppler Complete.  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 Left ventricular ejection fraction, by visual estimation, is 65 to   70%.   2. Normal global left ventricular systolic function.   3. No definitive evidence of valvular vegetation.    PHYSICIAN INTERPRETATION:  Left Ventricle: The left ventricular internal cavity size isnormal. Left   ventricular wall thickness is normal. Global LV systolic function was   normal. Left ventricular ejection fraction, by visual estimation, is 65   to 70%. Spectral Doppler shows normal pattern of LV diastolic filling.  Right Ventricle: Normal right ventricular size and function.  Left Atrium: Normal left atrial size.  Right Atrium: Normal right atrial size.  Pericardium: There is no evidence of pericardial effusion.  Mitral Valve: The mitral valve is normal in structure. No mitral   regurgitation.  Tricuspid Valve: The tricuspid valve is grossly normal in structure. No   tricuspid regurgitation visualized.  Aortic Valve: The aortic valve was not well visualized. The aortic valve   is trileaflet. No evidence of aortic stenosis. No aortic regurgitation.  Pulmonic Valve: The pulmonic valve was not well visualized.  Aorta: Aortic root measured at sinotubular junction is normal.  Venous: The inferior vena cava was normal sized, with respiratory size   variation greater than 50%.       2D AND M-MODE MEASUREMENTS (normal ranges within parentheses):  Left                  Normal   Aorta/Left             Normal  Ventricle:                     Atrium:  IVSd (2D):  0.61 cm  (0.7-1.1) AoV Cusp       1.40  (1.5-2.6)  LVPWd       0.82cm  (0.7-1.1) Separation:     cm  (2D):                          Left Atrium    3.63  (1.9-4.0)  LVIDd       5.18 cm  (3.4-5.7) (Mmode):        cm  (2D):                          LA Volume      10.4  LVIDs       3.12 cm            Index         ml/m²  (2D):                          Right  LV FS       39.7 %    (>25%)   Ventricle:  (2D):                          RVd (Mmode):   2.82 cm  IVSd        0.84 cm  (0.7-1.1) RVd (2D):      2.40 cm  (Mmode):  LVPWd       0.93 cm  (0.7-1.1)  (Mmode):  LVIDd     4.62 cm  (3.4-5.7)  (Mmode):  LVIDs       2.52 cm  (Mmode):  LV FS       45.5 %    (>25%)  (Mmode):  Relative     0.32     (<0.42)  Wall  Thickness  Rel. Wall    0.40     (<0.42)  Thickness  Mm  LV Mass    70.0 g/m²  Index:  Mmode    SPECTRAL DOPPLER ANALYSIS:  LV DIASTOLIC FUNCTION:  MV Peak E: 0.88 m/s Decel Time: 234 msec  MV Peak A: 0.39 m/s  E/A Ratio: 2.28    Aortic Valve:  AoV VMax:    1.18 m/s AoV Area, Vmax: 2.46 cm² Vmax Indx: 1.27 cm²/m²  AoV Pk Grad: 5.6 mmHg    LVOT Vmax: 0.85 m/s  LVOT VTI:  0.16 m  LVOT Diam: 2.09 cm    Mitral Valve:  MV P1/2 Time: 67.89 msec  MV Area, PHT: 3.24 cm²    Tricuspid Valve and PA/RV Systolic Pressure: TR Max Velocity: 2.20 m/s RA   Pressure: 3 mmHg RVSP/PASP: 22.4 mmHg    Pulmonic Valve:  PV Max Velocity: 1.00 m/s PV Max P.0 mmHg PV Mean PG:       [id number] Kendra Jimenez M.D., Electronically signed on 2019 at   9:55:27 AM              *** Final ***                    KENDRA JIMENEZ M.D., ATTENDING CARDIOLOGIST  This document has been electronically signed. Aug 12 2019  8:51AM             (19 @ 08:51)  12 Lead ECG:   Ventricular Rate 100 BPM    Atrial Rate 100 BPM    P-R Interval 134 ms    QRS Duration 80 ms    Q-T Interval 348 ms    QTC Calculation(Bezet) 448 ms    P Axis 25 degrees    R Axis 17 degrees    T Axis 1 degrees    Diagnosis Line Normal sinus rhythm  Nonspecific T wave abnormality  Abnormal ECG    Confirmed by Raghu Tapia ([id number]) on 2019 7:47:03 AM (08-10-19 @ 14:59)      All available historical records have been reviewed    MEDICATIONS  cefepime   IVPB 1000  docusate sodium 100  enoxaparin Injectable 40  gabapentin 300  hydrocortisone 1% Ointment 1  senna 2  vancomycin  IVPB 1250      ANTIBIOTICS:  cefepime   IVPB 1000 milliGRAM(s) IV Intermittent every 8 hours  vancomycin  IVPB 1250 milliGRAM(s) IV Intermittent every 12 hours        ASSESSMENT  23 y.o. M with no PMH is admitted due to sepsis without source, and anemia.      IMPRESSION  #Sepsis on admission:  - Sepsis is likely due to recent R hip drain from fasciotomy due to compartment syndrome.      RECOMMENDATIONS  - Patient is currently afebrile, not in any acute distress. WBC count today is 11.18.  - C/w vancomycin and cefepime.  - F/U Monospot and QuantiFERON tests. RODOLFO LOCO  23y, Male  Allergy: ibuprofen (Anaphylaxis)      CHIEF COMPLAINT: fever; elevated WBC (12 Aug 2019 10:59)      HPI:  23 y.o. M with no PMH presented to the ED two days ago due to fever of 101.6 F along with generalized weakness. 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 He was taken to the ED at [facility] and found to have a comminuted fracture of the R scapula, and R thigh hematoma. On  the patient developed compartment syndrome of the R hip and underwent fasciotomy. On  the patient was discharged from the hospital, but the next day he had an episode of chest tightness and SOB, so he came to the ED at [facility], where he had WBC of 9.87, Hgb/Hct 8/23.8, D-dimer 462, and CTA chest was negative for PE. The patient was then discharged home.    On  the patient had low-grade fever at night, which resolved with Tylenol. On 08/10 the patient had a fever of 101.6 F along with generalized weakness. He went to urgent care, where he had an elevated WBC count and Hgb 8.7; he was referred to the ED. He then came to ED at [facility] where he had temp 99, , /65, RR 19, WBC 18.19, Hgb/Hct 8.4/25.6, and platelets 437. The patient was admitted for sepsis due to unknown source, and started on vancomycin and cefepime. His urine culture was negative, CXR negative, CT abdomen/pelvis with IV contrast showed mild inflammatory changes to R subcutaneous tissue, correlate with cellulitis, no abscess. Duplex ultrasound of R leg was negative for DVT. TTE did not reveal any valvular vegetation.     Parents were also present at bedside. The mother states that the patient's older brother had common cold a few days ago.     FAMILY HISTORY:    PAST MEDICAL & SURGICAL HISTORY:  Compartment syndrome  Scapular fracture  No pertinent past medical history  H/O fasciotomy      SOCIAL HISTORY    Substance Use ( x ) never used  (  ) IVDU (  ) Other:  Tobacco Usage:  ( x  ) never smoked   (   ) former smoker   (   ) current smoker   Alcohol Usage: (   ) social  (   ) daily use ( x  ) denies  Sexual History: NA      ROS  General: denies rigors, nightsweats  HEENT: Denies headache, rhinorrhea, sore throat, eye pain  CV: Denies CP, palpitations  PULM: Denies SOB, wheezing  GI: Denies abdominal pain, hematochezia/melena  : Denies dysuria, hematuria  MSK: complains of R shoulder pain, Denies arthralgias, myalgias in other areas of the body  SKIN: Denies rash, lesions  NEURO: Denies paresthesias, weakness  PSYCH: Denies depression, anxiety    VITALS:  T(F): 97.6, Max: 99.3 (19 @ 15:04)  HR: 89  BP: 126/72  RR: 18Vital Signs Last 24 Hrs  T(C): 36.4 (12 Aug 2019 05:36), Max: 37.4 (11 Aug 2019 15:04)  T(F): 97.6 (12 Aug 2019 05:36), Max: 99.3 (11 Aug 2019 15:04)  HR: 89 (12 Aug 2019 05:36) (89 - 95)  BP: 126/72 (12 Aug 2019 05:36) (126/72 - 139/68)  BP(mean): --  RR: 18 (12 Aug 2019 05:36) (18 - 18)  SpO2: --    PHYSICAL EXAM:  Gen: NAD, resting in bed  HEENT: Normocephalic, atraumatic  Neck: supple, no lymphadenopathy  CV: Regular rate & regular rhythm  Lungs: decreased BS at bases  Abdomen: Soft, BS present  Ext: Warm, well perfused  Neuro: non focal, awake  Skin: R arm sling, R buttock drain with serosanguinous drainage. +induration    TESTS & MEASUREMENTS:                        8.4    18.19 )-----------( 437      ( 10 Aug 2019 14:45 )             25.6     08-10    140  |  97<L>  |  17  ----------------------------<  77  3.8   |  28  |  0.7    Ca    7.8<L>      10 Aug 2019 14:45  Mg     2.1     08-11    TPro  6.9  /  Alb  4.1  /  TBili  1.0  /  DBili  x   /  AST  31  /  ALT  87<H>  /  AlkPhos  85  08-10      LIVER FUNCTIONS - ( 10 Aug 2019 14:45 )  Alb: 4.1 g/dL / Pro: 6.9 g/dL / ALK PHOS: 85 U/L / ALT: 87 U/L / AST: 31 U/L / GGT: x           Urinalysis Basic - ( 10 Aug 2019 14:09 )    Color: Yellow / Appearance: Clear / S.024 / pH: x  Gluc: x / Ketone: Negative  / Bili: Negative / Urobili: 6 mg/dL   Blood: x / Protein: Trace / Nitrite: Negative   Leuk Esterase: Negative / RBC: x / WBC x   Sq Epi: x / Non Sq Epi: x / Bacteria: x        Culture - Blood (collected 08-10-19 @ 14:45)  Source: .Blood Blood  Preliminary Report (19 @ 23:01):    No growth to date.    Culture - Blood (collected 08-10-19 @ 14:45)  Source: .Blood Blood  Preliminary Report (19 @ 23:01):    No growth to date.    Culture - Urine (collected 08-10-19 @ 14:09)  Source: .Urine Clean Catch (Midstream)  Final Report (19 @ 18:02):    No growth        Lactate, Blood: 1.3 mmol/L (08-10-19 @ 14:45)      INFECTIOUS DISEASES TESTING  Rapid HIV-1/2 Antibody: Nonreact (19 @ 18:57)  MRSA PCR Result.: Negative (19 @ 10:58)  Influenza A and B: Negative  RSV: Negative  RVP: Negative  QuantiFERON: pending      RADIOLOGY & ADDITIONAL TESTS:  I have personally reviewed the last Chest xray  CXR  Xray Chest 1 View- PORTABLE-Urgent:   EXAM:  XR CHEST PORTABLE URGENT 1V            PROCEDURE DATE:  08/10/2019            INTERPRETATION:  Clinical History/Reason For Exam: fever    Comparison : Chest radiograph 2019.    Technique: XR CHEST URGENT    Findings:    Support devices: None.    Cardiac/mediastinum/hilum: Unremarkable.    Lung parenchyma/Pleura: Within normal limits.    Skeleton/soft tissues: Unremarkable.    Impression:      No radiographic evidence of acute cardiopulmonary disease.                      BOGDAN CAPONE M.D., ATTENDING RADIOLOGIST  This document has been electronically signed. Aug 11 2019  1:23PM             (08-10-19 @ 15:42)      CT  CT Abdomen and Pelvis w/ IV Cont:   EXAM:  CT ABDOMEN AND PELVIS IC            PROCEDURE DATE:  08/10/2019            INTERPRETATION:  CLINICAL STATEMENT: Abdominal pain. Trauma to pelvis.   Fever.    TECHNIQUE: Contiguous axial CT images were obtained from the lower chest   to the pubic symphysis following administration of 100cc Optiray 320   intravenous contrast.  Oral contrast was not administered.  Reformatted   images in the coronal and sagittal planes were acquired.    Comparison made with CT chest 2019.    FINDINGS:    LOWER CHEST: Unremarkable.    HEPATOBILIARY: Unremarkable.    SPLEEN: Unremarkable.    PANCREAS: Unremarkable.    ADRENAL GLANDS: Unremarkable.    KIDNEYS: Unremarkable.    ABDOMINOPELVIC NODES: Unremarkable.    PELVIC ORGANS: Unremarkable.    PERITONEUM/MESENTERY/BOWEL: Unremarkable.    BONES/SOFT TISSUES: Partially imaged right scapular fracture. Mild   inflammatory change to right gluteal subcutaneous tissues with drainage   catheter; no evidence of abscess.      IMPRESSION:   1. Mild inflammatory change to right gluteal subcutaneous tissues with   drainage catheter, correlate for cellulitis; no evidence of abscess.  2. Partially imaged right scapular fracture.                  ANDREA NAPIER M.D., ATTENDING RADIOLOGIST  This document has been electronically signed. Aug 10 2019  9:40PM             (08-10-19 @ 21:35)      CARDIOLOGY TESTING  Transthoracic Echocardiogram:    EXAM:  2-D ECHO (TTE) COMPLETE        PROCEDURE DATE:  2019      INTERPRETATION:  REPORT:    TRANSTHORACIC ECHOCARDIOGRAM REPORT         Patient Name:   RODOLFO LOCO Accession #: [id number]  Medical Rec #:  [id number]      Height:      66.0 in 167.6 cm  YOB: 1995    Weight:      185.0 lb 83.92 kg  Patient Age:    23 years      BSA:         1.93 m²  Patient Gender: M             BP:          127/72 mmHg       Date of Exam:        2019 8:51:02 AM  Referring Physician: [id number] ED UNASSIGNED  Sonographer:         JUICE  Reading Physician:   Kendra Jimenez M.D.    Procedure: 2D Echo/Doppler/Color Doppler Complete.  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 Left ventricular ejection fraction, by visual estimation, is 65 to   70%.   2. Normal global left ventricular systolic function.   3. No definitive evidence of valvular vegetation.    PHYSICIAN INTERPRETATION:  Left Ventricle: The left ventricular internal cavity size isnormal. Left   ventricular wall thickness is normal. Global LV systolic function was   normal. Left ventricular ejection fraction, by visual estimation, is 65   to 70%. Spectral Doppler shows normal pattern of LV diastolic filling.  Right Ventricle: Normal right ventricular size and function.  Left Atrium: Normal left atrial size.  Right Atrium: Normal right atrial size.  Pericardium: There is no evidence of pericardial effusion.  Mitral Valve: The mitral valve is normal in structure. No mitral   regurgitation.  Tricuspid Valve: The tricuspid valve is grossly normal in structure. No   tricuspid regurgitation visualized.  Aortic Valve: The aortic valve was not well visualized. The aortic valve   is trileaflet. No evidence of aortic stenosis. No aortic regurgitation.  Pulmonic Valve: The pulmonic valve was not well visualized.  Aorta: Aortic root measured at sinotubular junction is normal.  Venous: The inferior vena cava was normal sized, with respiratory size   variation greater than 50%.       2D AND M-MODE MEASUREMENTS (normal ranges within parentheses):  Left                  Normal   Aorta/Left             Normal  Ventricle:                     Atrium:  IVSd (2D):  0.61 cm  (0.7-1.1) AoV Cusp       1.40  (1.5-2.6)  LVPWd       0.82cm  (0.7-1.1) Separation:     cm  (2D):                          Left Atrium    3.63  (1.9-4.0)  LVIDd       5.18 cm  (3.4-5.7) (Mmode):        cm  (2D):                          LA Volume      10.4  LVIDs       3.12 cm            Index         ml/m²  (2D):                          Right  LV FS       39.7 %    (>25%)   Ventricle:  (2D):                          RVd (Mmode):   2.82 cm  IVSd        0.84 cm  (0.7-1.1) RVd (2D):      2.40 cm  (Mmode):  LVPWd       0.93 cm  (0.7-1.1)  (Mmode):  LVIDd     4.62 cm  (3.4-5.7)  (Mmode):  LVIDs       2.52 cm  (Mmode):  LV FS       45.5 %    (>25%)  (Mmode):  Relative     0.32     (<0.42)  Wall  Thickness  Rel. Wall    0.40     (<0.42)  Thickness  Mm  LV Mass    70.0 g/m²  Index:  Mmode    SPECTRAL DOPPLER ANALYSIS:  LV DIASTOLIC FUNCTION:  MV Peak E: 0.88 m/s Decel Time: 234 msec  MV Peak A: 0.39 m/s  E/A Ratio: 2.28    Aortic Valve:  AoV VMax:    1.18 m/s AoV Area, Vmax: 2.46 cm² Vmax Indx: 1.27 cm²/m²  AoV Pk Grad: 5.6 mmHg    LVOT Vmax: 0.85 m/s  LVOT VTI:  0.16 m  LVOT Diam: 2.09 cm    Mitral Valve:  MV P1/2 Time: 67.89 msec  MV Area, PHT: 3.24 cm²    Tricuspid Valve and PA/RV Systolic Pressure: TR Max Velocity: 2.20 m/s RA   Pressure: 3 mmHg RVSP/PASP: 22.4 mmHg    Pulmonic Valve:  PV Max Velocity: 1.00 m/s PV Max P.0 mmHg PV Mean PG:       [id number] Kendra Jimenez M.D., Electronically signed on 2019 at   9:55:27 AM              *** Final ***                    KENDRA JIMENEZ M.D., ATTENDING CARDIOLOGIST  This document has been electronically signed. Aug 12 2019  8:51AM             (19 @ 08:51)  12 Lead ECG:   Ventricular Rate 100 BPM    Atrial Rate 100 BPM    P-R Interval 134 ms    QRS Duration 80 ms    Q-T Interval 348 ms    QTC Calculation(Bezet) 448 ms    P Axis 25 degrees    R Axis 17 degrees    T Axis 1 degrees    Diagnosis Line Normal sinus rhythm  Nonspecific T wave abnormality  Abnormal ECG    Confirmed by Raghu Tapia ([id number]) on 2019 7:47:03 AM (08-10-19 @ 14:59)      All available historical records have been reviewed    MEDICATIONS  cefepime   IVPB 1000  docusate sodium 100  enoxaparin Injectable 40  gabapentin 300  hydrocortisone 1% Ointment 1  senna 2  vancomycin  IVPB 1250      ANTIBIOTICS:  cefepime   IVPB 1000 milliGRAM(s) IV Intermittent every 8 hours  vancomycin  IVPB 1250 milliGRAM(s) IV Intermittent every 12 hours

## 2019-08-13 VITALS
DIASTOLIC BLOOD PRESSURE: 59 MMHG | SYSTOLIC BLOOD PRESSURE: 119 MMHG | TEMPERATURE: 100 F | RESPIRATION RATE: 18 BRPM | HEART RATE: 87 BPM

## 2019-08-13 LAB
ANION GAP SERPL CALC-SCNC: 15 MMOL/L — HIGH (ref 7–14)
BASOPHILS # BLD AUTO: 0.06 K/UL — SIGNIFICANT CHANGE UP (ref 0–0.2)
BASOPHILS NFR BLD AUTO: 0.4 % — SIGNIFICANT CHANGE UP (ref 0–1)
BUN SERPL-MCNC: 15 MG/DL — SIGNIFICANT CHANGE UP (ref 10–20)
CALCIUM SERPL-MCNC: 7.8 MG/DL — LOW (ref 8.5–10.1)
CHLORIDE SERPL-SCNC: 99 MMOL/L — SIGNIFICANT CHANGE UP (ref 98–110)
CO2 SERPL-SCNC: 25 MMOL/L — SIGNIFICANT CHANGE UP (ref 17–32)
CREAT SERPL-MCNC: 0.6 MG/DL — LOW (ref 0.7–1.5)
EOSINOPHIL # BLD AUTO: 0.29 K/UL — SIGNIFICANT CHANGE UP (ref 0–0.7)
EOSINOPHIL NFR BLD AUTO: 2.1 % — SIGNIFICANT CHANGE UP (ref 0–8)
FERRITIN SERPL-MCNC: 334 NG/ML — SIGNIFICANT CHANGE UP (ref 30–400)
GLUCOSE SERPL-MCNC: 103 MG/DL — HIGH (ref 70–99)
HCT VFR BLD CALC: 25.1 % — LOW (ref 42–52)
HETEROPH AB TITR SER AGGL: NEGATIVE — SIGNIFICANT CHANGE UP
HGB BLD-MCNC: 8.3 G/DL — LOW (ref 14–18)
IMM GRANULOCYTES NFR BLD AUTO: 1.4 % — HIGH (ref 0.1–0.3)
LYMPHOCYTES # BLD AUTO: 1.68 K/UL — SIGNIFICANT CHANGE UP (ref 1.2–3.4)
LYMPHOCYTES # BLD AUTO: 12.2 % — LOW (ref 20.5–51.1)
MAGNESIUM SERPL-MCNC: 2.1 MG/DL — SIGNIFICANT CHANGE UP (ref 1.8–2.4)
MCHC RBC-ENTMCNC: 28.9 PG — SIGNIFICANT CHANGE UP (ref 27–31)
MCHC RBC-ENTMCNC: 33.1 G/DL — SIGNIFICANT CHANGE UP (ref 32–37)
MCV RBC AUTO: 87.5 FL — SIGNIFICANT CHANGE UP (ref 80–94)
MONOCYTES # BLD AUTO: 1.02 K/UL — HIGH (ref 0.1–0.6)
MONOCYTES NFR BLD AUTO: 7.4 % — SIGNIFICANT CHANGE UP (ref 1.7–9.3)
NEUTROPHILS # BLD AUTO: 10.57 K/UL — HIGH (ref 1.4–6.5)
NEUTROPHILS NFR BLD AUTO: 76.5 % — HIGH (ref 42.2–75.2)
NRBC # BLD: 0 /100 WBCS — SIGNIFICANT CHANGE UP (ref 0–0)
PLATELET # BLD AUTO: 376 K/UL — SIGNIFICANT CHANGE UP (ref 130–400)
POTASSIUM SERPL-MCNC: 3.8 MMOL/L — SIGNIFICANT CHANGE UP (ref 3.5–5)
POTASSIUM SERPL-SCNC: 3.8 MMOL/L — SIGNIFICANT CHANGE UP (ref 3.5–5)
RBC # BLD: 2.87 M/UL — LOW (ref 4.7–6.1)
RBC # FLD: 13.5 % — SIGNIFICANT CHANGE UP (ref 11.5–14.5)
SODIUM SERPL-SCNC: 139 MMOL/L — SIGNIFICANT CHANGE UP (ref 135–146)
WBC # BLD: 13.82 K/UL — HIGH (ref 4.8–10.8)
WBC # FLD AUTO: 13.82 K/UL — HIGH (ref 4.8–10.8)

## 2019-08-13 PROCEDURE — 99239 HOSP IP/OBS DSCHRG MGMT >30: CPT

## 2019-08-13 RX ORDER — OXYCODONE HYDROCHLORIDE 5 MG/1
2 TABLET ORAL
Qty: 56 | Refills: 0
Start: 2019-08-13

## 2019-08-13 RX ORDER — OXYCODONE HYDROCHLORIDE 5 MG/1
1 TABLET ORAL
Qty: 0 | Refills: 0 | DISCHARGE

## 2019-08-13 RX ORDER — FERROUS SULFATE 325(65) MG
1 TABLET ORAL
Qty: 30 | Refills: 0
Start: 2019-08-13 | End: 2019-09-11

## 2019-08-13 RX ORDER — OXYCODONE HYDROCHLORIDE 5 MG/1
1 TABLET ORAL
Qty: 28 | Refills: 0
Start: 2019-08-13

## 2019-08-13 RX ADMIN — GABAPENTIN 300 MILLIGRAM(S): 400 CAPSULE ORAL at 05:30

## 2019-08-13 RX ADMIN — Medication 100 MILLIGRAM(S): at 05:30

## 2019-08-13 RX ADMIN — OXYCODONE HYDROCHLORIDE 10 MILLIGRAM(S): 5 TABLET ORAL at 05:29

## 2019-08-13 RX ADMIN — OXYCODONE HYDROCHLORIDE 10 MILLIGRAM(S): 5 TABLET ORAL at 06:30

## 2019-08-13 RX ADMIN — CEFEPIME 100 MILLIGRAM(S): 1 INJECTION, POWDER, FOR SOLUTION INTRAMUSCULAR; INTRAVENOUS at 05:26

## 2019-08-13 NOTE — DISCHARGE NOTE PROVIDER - CARE PROVIDER_API CALL
Farzad Dillon (DO)  Medicine  Physicians  [address] [place] [address]  Phone: [phone]  Fax: [phone]  Follow Up Time: 1-3 days

## 2019-08-13 NOTE — PROGRESS NOTE ADULT - SUBJECTIVE AND OBJECTIVE BOX
RODOLFO LOCO  23y, Male  Allergy: ibuprofen (Anaphylaxis)      CHIEF COMPLAINT: sepsis (12 Aug 2019 12:25)      INTERVAL EVENTS/HPI  - No acute events overnight tm 100  - T(F): , Max: 100 (19 @ 05:57)  - Denies any worsening symptoms  - Tolerating medication  - WBC Count: 12.93 K/uL (19 @ 22:22)      ROS  General: Denies rigors, nightsweats  HEENT: Denies headache, rhinorrhea, sore throat, eye pain  CV: Denies CP, palpitations  PULM: Denies SOB, wheezing  GI: Denies abdominal pain, hematochezia/melena  : Denies dysuria, hematuria  MSK: Denies arthralgias, myalgias  SKIN: Denies rash, lesions  NEURO: Denies paresthesias, weakness  PSYCH: Denies depression, anxiety    VITALS:  T(F): 100, Max: 100 (19 @ 05:57)  HR: 87  BP: 119/59  RR: 18Vital Signs Last 24 Hrs  T(C): 37.8 (13 Aug 2019 05:57), Max: 37.8 (13 Aug 2019 05:57)  T(F): 100 (13 Aug 2019 05:57), Max: 100 (13 Aug 2019 05:57)  HR: 87 (13 Aug 2019 05:57) (87 - 93)  BP: 119/59 (13 Aug 2019 05:57) (118/70 - 125/62)  BP(mean): --  RR: 18 (13 Aug 2019 05:57) (18 - 18)  SpO2: --    PHYSICAL EXAM:  Gen: NAD, resting in bed  HEENT: Normocephalic, atraumatic  Neck: supple, no lymphadenopathy  CV: Regular rate & regular rhythm  Lungs: decreased BS at bases  Abdomen: Soft, BS present  Ext: Warm, well perfused  Neuro: non focal, awake  Skin: R arm sling, R buttock drain site +induration      FH: Non-contributory  Social Hx: Non-contributory    TESTS & MEASUREMENTS:                        8.6     )-----------( 375      ( 12 Aug 2019 22:22 )             26.3     08-    142  |  100  |  14  ----------------------------<  83  4.0   |  27  |  0.6<L>    Ca    8.0<L>      12 Aug 2019 11:19  Mg     2.2         TPro  6.2  /  Alb  3.7  /  TBili  0.8  /  DBili  x   /  AST  16  /  ALT  39  /  AlkPhos  73      eGFR if Non African American: 141 mL/min/1.73M2 (19 @ 11:19)  eGFR if : 163 mL/min/1.73M2 (19 @ 11:19)  eGFR if Non African American: 141 mL/min/1.73M2 (19 @ 11:19)  eGFR if : 163 mL/min/1.73M2 (19 @ 11:19)    LIVER FUNCTIONS - ( 12 Aug 2019 11:19 )  Alb: 3.7 g/dL / Pro: 6.2 g/dL / ALK PHOS: 73 U/L / ALT: 39 U/L / AST: 16 U/L / GGT: x               Culture - Blood (collected 19 @ 18:57)  Source: .Blood Blood  Preliminary Report (19 @ 01:01):    No growth to date.    Culture - Blood (collected 08-10-19 @ 14:45)  Source: .Blood Blood  Preliminary Report (19 @ 23:01):    No growth to date.    Culture - Blood (collected 08-10-19 @ 14:45)  Source: .Blood Blood  Preliminary Report (19 @ 23:01):    No growth to date.    Culture - Urine (collected 08-10-19 @ 14:09)  Source: .Urine Clean Catch (Midstream)  Final Report (19 @ 18:02):    No growth        Lactate, Blood: 1.3 mmol/L (08-10-19 @ 14:45)      INFECTIOUS DISEASES TESTING  Rapid HIV-1/2 Antibody: Nonreact (19 @ 18:57)  Rapid RVP Result: NotDetec (19 @ 10:58)  MRSA PCR Result.: Negative (19 @ 10:58)      RADIOLOGY & ADDITIONAL TESTS:  I have personally reviewed the last Chest xray  CXR  Xray Chest 1 View- PORTABLE-Urgent:   EXAM:  XR CHEST PORTABLE URGENT 1V            PROCEDURE DATE:  08/10/2019            INTERPRETATION:  Clinical History/Reason For Exam: fever    Comparison : Chest radiograph 2019.    Technique: XR CHEST URGENT    Findings:    Support devices: None.    Cardiac/mediastinum/hilum: Unremarkable.    Lung parenchyma/Pleura: Within normal limits.    Skeleton/soft tissues: Unremarkable.    Impression:      No radiographic evidence of acute cardiopulmonary disease.                      BOGDAN CAPONE M.D., ATTENDING RADIOLOGIST  This document has been electronically signed. Aug 11 2019  1:23PM             (08-10-19 @ 15:42)      CT  CT Abdomen and Pelvis w/ IV Cont:   EXAM:  CT ABDOMEN AND PELVIS IC            PROCEDURE DATE:  08/10/2019            INTERPRETATION:  CLINICAL STATEMENT: Abdominal pain. Trauma to pelvis.   Fever.    TECHNIQUE: Contiguous axial CT images were obtained from the lower chest   to the pubic symphysis following administration of 100cc Optiray 320   intravenous contrast.  Oral contrast was not administered.  Reformatted   images in the coronal and sagittal planes were acquired.    Comparison made with CT chest 2019.    FINDINGS:    LOWER CHEST: Unremarkable.    HEPATOBILIARY: Unremarkable.    SPLEEN: Unremarkable.    PANCREAS: Unremarkable.    ADRENAL GLANDS: Unremarkable.    KIDNEYS: Unremarkable.    ABDOMINOPELVIC NODES: Unremarkable.    PELVIC ORGANS: Unremarkable.    PERITONEUM/MESENTERY/BOWEL: Unremarkable.    BONES/SOFT TISSUES: Partially imaged right scapular fracture. Mild   inflammatory change to right gluteal subcutaneous tissues with drainage   catheter; no evidence of abscess.      IMPRESSION:   1. Mild inflammatory change to right gluteal subcutaneous tissues with   drainage catheter, correlate for cellulitis; no evidence of abscess.  2. Partially imaged right scapular fracture.                  ANDREA NAPIER M.D., ATTENDING RADIOLOGIST  This document has been electronically signed. Aug 10 2019  9:40PM             (08-10-19 @ 21:35)      CARDIOLOGY TESTING  Transthoracic Echocardiogram:    EXAM:  2-D ECHO (TTE) COMPLETE        PROCEDURE DATE:  2019      INTERPRETATION:  REPORT:    TRANSTHORACIC ECHOCARDIOGRAM REPORT         Patient Name:   RODOLFO LOCO Accession #: [id number]  Medical Rec #:  [id number]      Height:      66.0 in 167.6 cm  YOB: 1995    Weight:      185.0 lb 83.92 kg  Patient Age:    23 years      BSA:         1.93 m²  Patient Gender: M             BP:          127/72 mmHg       Date of Exam:        2019 8:51:02 AM  Referring Physician: [id number] ED UNASSIGNED  Sonographer:         JUICE Werner Physician:   Kendra Jimenez M.D.    Procedure: 2D Echo/Doppler/Color Doppler Complete.  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 Left ventricular ejection fraction, by visual estimation, is 65 to   70%.   2. Normal global left ventricular systolic function.   3. No definitive evidence of valvular vegetation.    PHYSICIAN INTERPRETATION:  Left Ventricle: The left ventricular internal cavity size isnormal. Left   ventricular wall thickness is normal. Global LV systolic function was   normal. Left ventricular ejection fraction, by visual estimation, is 65   to 70%. Spectral Doppler shows normal pattern of LV diastolic filling.  Right Ventricle: Normal right ventricular size and function.  Left Atrium: Normal left atrial size.  Right Atrium: Normal right atrial size.  Pericardium: There is no evidence of pericardial effusion.  Mitral Valve: The mitral valve is normal in structure. No mitral   regurgitation.  Tricuspid Valve: The tricuspid valve is grossly normal in structure. No   tricuspid regurgitation visualized.  Aortic Valve: The aortic valve was not well visualized. The aortic valve   is trileaflet. No evidence of aortic stenosis. No aortic regurgitation.  Pulmonic Valve: The pulmonic valve was not well visualized.  Aorta: Aortic root measured at sinotubular junction is normal.  Venous: The inferior vena cava was normal sized, with respiratory size   variation greater than 50%.       2D AND M-MODE MEASUREMENTS (normal ranges within parentheses):  Left                  Normal   Aorta/Left             Normal  Ventricle:                     Atrium:  IVSd (2D):  0.61 cm  (0.7-1.1) AoV Cusp       1.40  (1.5-2.6)  LVPWd       0.82cm  (0.7-1.1) Separation:     cm  (2D):                          Left Atrium    3.63  (1.9-4.0)  LVIDd       5.18 cm  (3.4-5.7) (Mmode):        cm  (2D):                          LA Volume      10.4  LVIDs       3.12 cm            Index         ml/m²  (2D):                          Right  LV FS       39.7 %    (>25%)   Ventricle:  (2D):                          RVd (Mmode):   2.82 cm  IVSd        0.84 cm  (0.7-1.1) RVd (2D):      2.40 cm  (Mmode):  LVPWd       0.93 cm  (0.7-1.1)  (Mmode):  LVIDd     4.62 cm  (3.4-5.7)  (Mmode):  LVIDs       2.52 cm  (Mmode):  LV FS       45.5 %    (>25%)  (Mmode):  Relative     0.32     (<0.42)  Wall  Thickness  Rel. Wall    0.40     (<0.42)  Thickness  Mm  LV Mass    70.0 g/m²  Index:  Mmode    SPECTRAL DOPPLER ANALYSIS:  LV DIASTOLIC FUNCTION:  MV Peak E: 0.88 m/s Decel Time: 234 msec  MV Peak A: 0.39 m/s  E/A Ratio: 2.28    Aortic Valve:  AoV VMax:    1.18 m/s AoV Area, Vmax: 2.46 cm² Vmax Indx: 1.27 cm²/m²  AoV Pk Grad: 5.6 mmHg    LVOT Vmax: 0.85 m/s  LVOT VTI:  0.16 m  LVOT Diam: 2.09 cm    Mitral Valve:  MV P1/2 Time: 67.89 msec  MV Area, PHT: 3.24 cm²    Tricuspid Valve and PA/RV Systolic Pressure: TR Max Velocity: 2.20 m/s RA   Pressure: 3 mmHg RVSP/PASP: 22.4 mmHg    Pulmonic Valve:  PV Max Velocity: 1.00 m/s PV Max P.0 mmHg PV Mean PG:       [id number] Kendra Jimenez M.D., Electronically signed on 2019 at   9:55:27 AM              *** Final ***                    KENDRA JIMENEZ M.D., ATTENDING CARDIOLOGIST  This document has been electronically signed. Aug 12 2019  8:51AM             (19 @ 08:51)  12 Lead ECG:   Ventricular Rate 100 BPM    Atrial Rate 100 BPM    P-R Interval 134 ms    QRS Duration 80 ms    Q-T Interval 348 ms    QTC Calculation(Bezet) 448 ms    P Axis 25 degrees    R Axis 17 degrees    T Axis 1 degrees    Diagnosis Line Normal sinus rhythm  Nonspecific T wave abnormality  Abnormal ECG    Confirmed by Raghu Tapia ([id number]) on 2019 7:47:03 AM (08-10-19 @ 14:59)      MEDICATIONS  cefepime   IVPB 1000  docusate sodium 100  enoxaparin Injectable 40  ferrous    sulfate 325  gabapentin 300  hydrocortisone 1% Ointment 1  multivitamin/minerals 1  senna 2  vancomycin  IVPB 1250      ANTIBIOTICS:  cefepime   IVPB 1000 milliGRAM(s) IV Intermittent every 8 hours  vancomycin  IVPB 1250 milliGRAM(s) IV Intermittent every 12 hours      All available historical records have been reviewed

## 2019-08-13 NOTE — PROVIDER CONTACT NOTE (MEDICATION) - ACTION/TREATMENT ORDERED:
md hutton made aware that this will be the 5th dose w/o trough level drawn, he will put in orders for the  to draw. advised to hold 6am dose until the trough is drawn
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
## 2019-08-13 NOTE — PROGRESS NOTE ADULT - SUBJECTIVE AND OBJECTIVE BOX
CLAUDY RODOLFO  23y  Male  ***My note supersedes ALL resident notes that I sign.  My corrections for their notes are in my note.***    I can be reached directly on [employer]. My office number is [phone]. My personal cell number is [phone].    INTERVAL EVENTS: Here for f/u of trauma. Pt doing well. Feels much better after bld tx. Slept well last night w/ new dose of oxycodone. Pt would like to go early today to make ortho appt in [place]. Sx told him to have VANE removed in [place].    T(F): 100 (08-13-19 @ 05:57), Max: 100 (08-13-19 @ 05:57)  HR: 87 (08-13-19 @ 05:57) (87 - 93)  BP: 119/59 (08-13-19 @ 05:57) (118/70 - 125/62)  RR: 18 (08-13-19 @ 05:57) (18 - 18)  SpO2: --    Gen: + pain, but more comfortable  HEENT: WNL  Neck: no nodes, no JVD  lung clr; has splinting on rt w/ deep breathing  hrt s1 s2 slightly tachy no murmur  abd soft NT ND  ext - rt arm sling; no edema in legs, no c/c  buttock: VANE drain - min output; site is fine; suture line is unremarkable  neuro: nl, just limited by pain and fx of scapula    LABS:                        8.6     (    88.0   12.93 )-----------( ---------      375      ( 12 Aug 2019 22:22 )             26.3    (    13.6     WBC Count: 12.93 K/uL (08-12-19 @ 22:22)  WBC Count: 11.18 K/uL (08-12-19 @ 11:19)  WBC Count: 18.19 K/uL (08-10-19 @ 14:45)    Hemoglobin: 8.6 g/dL (08-12 @ 22:22) - after tx  Hemoglobin: 7.6 g/dL (08-12 @ 11:19) - gave 1 u PRBC  Hemoglobin: 8.4 g/dL (08-10 @ 14:45)    142   (   100   (   83      08-12-19 @ 11:19  ----------------------               4.0   (   27   (   14                             -----                        0.6  Ca  8.0   Mg  2.2    P   --     LFT  6.2  (  0.8  (  16       08-12-19 @ 11:19  -------------------------  3.7  (  73  (  39    RADIOLOGY & ADDITIONAL TESTS:      MEDICATIONS:  cefepime   IVPB 1000 milliGRAM(s) IV Intermittent every 8 hours  vancomycin  IVPB 1250 milliGRAM(s) IV Intermittent every 12 hours    acetaminophen   Tablet .. 650 milliGRAM(s) Oral every 6 hours PRN  docusate sodium 100 milliGRAM(s) Oral two times a day  enoxaparin Injectable 40 milliGRAM(s) SubCutaneous daily  ferrous    sulfate 325 milliGRAM(s) Oral daily  gabapentin 300 milliGRAM(s) Oral three times a day  hydrocortisone 1% Ointment 1 Application(s) Topical daily  multivitamin/minerals 1 Tablet(s) Oral daily  oxyCODONE    IR 10 milliGRAM(s) Oral every 6 hours PRN  senna 2 Tablet(s) Oral at bedtime

## 2019-08-13 NOTE — DISCHARGE NOTE PROVIDER - NSDCCPCAREPLAN_GEN_ALL_CORE_FT
PRINCIPAL DISCHARGE DIAGNOSIS  Diagnosis: Fever  Assessment and Plan of Treatment: You had a fever most likely secondary to infected hematoma (less likely cellulitis) vs inflammatory reaction (SIRS) to hematoma. You responded to antibiotics, became afebrile, and will be discharged home on Levaquin and Doxycyline.      SECONDARY DISCHARGE DIAGNOSES  Diagnosis: Anemia  Assessment and Plan of Treatment: You have anemia, most likely secondary to the hematoma in your groin. On August 12th, you received 1 unit of PRBC's, and your hemoglobin increased. Be sure to follow up with your primary care doctor for bloodwork within the next 3-4 days.    Diagnosis: Elevated white blood cell count  Assessment and Plan of Treatment: You had an elevated white blood cell count most likely secondary to infected hematoma (less likely cellulitis) vs inflammatory reaction (SIRS) to hematoma. You responded to antibiotics, white blood cell count became normal, and will be discharged home on Levaquin and Doxycyline. PRINCIPAL DISCHARGE DIAGNOSIS  Diagnosis: Fever  Assessment and Plan of Treatment: You had a fever most likely secondary to infected hematoma (less likely cellulitis) vs inflammatory reaction (SIRS) to hematoma. You responded to antibiotics, became afebrile, and will be discharged home on Levaquin and Doxycyline.      SECONDARY DISCHARGE DIAGNOSES  Diagnosis: Anemia  Assessment and Plan of Treatment: You have anemia, most likely secondary to the hematoma in your groin. On August 12th, you received 1 unit of PRBC's, and your hemoglobin increased. Be sure to follow up with your primary care doctor for bloodwork within the next 3-4 days. Continue iron supplement and Multivitamin to make own red blood cells.    Diagnosis: Elevated white blood cell count  Assessment and Plan of Treatment: You had an elevated white blood cell count most likely secondary to infected hematoma (less likely cellulitis) vs inflammatory reaction (SIRS) to hematoma. You responded to antibiotics, white blood cell count became normal, and will be discharged home on Levaquin and Doxycyline.

## 2019-08-13 NOTE — PROVIDER CONTACT NOTE (MEDICATION) - ASSESSMENT
pt due for vancomyocin @ 6am; trough level has not been drawn yet & the 6am dose will be the 5th dose to be given

## 2019-08-13 NOTE — DISCHARGE NOTE PROVIDER - NSFOLLOWUPCLINICS_GEN_ALL_ED_FT
[facility]  Medicine  [address], [place]   Phone: [phone]  Fax:   Follow Up Time: 1-3 Days

## 2019-08-13 NOTE — CHART NOTE - NSCHARTNOTEFT_GEN_A_CORE
<<<RESIDENT DISCHARGE NOTE>>>     RODOLFO LOCO  [id number]    VITAL SIGNS:  T(F): 100 (08-13-19 @ 05:57), Max: 100 (08-13-19 @ 05:57)  HR: 87 (08-13-19 @ 05:57)  BP: 119/59 (08-13-19 @ 05:57)  SpO2: --    PHYSICAL EXAM  Gen: + pain, but more comfortable  HEENT: WNL  Neck: no nodes, no JVD  lung clr; has splinting on rt w/ deep breathing  hrt s1 s2 slightly tachy no murmur  abd soft NT ND  ext - rt arm sling; no edema in legs, no c/c  buttock: VANE drain - min output; site is fine; suture line is unremarkable  neuro: nl, just limited by pain and fx of scapula    TEST RESULTS:                        8.6    12.93 )-----------( 375      ( 12 Aug 2019 22:22 )             26.3       08-12    142  |  100  |  14  ----------------------------<  83  4.0   |  27  |  0.6<L>    Ca    8.0<L>      12 Aug 2019 11:19  Mg     2.2     08-12    TPro  6.2  /  Alb  3.7  /  TBili  0.8  /  DBili  x   /  AST  16  /  ALT  39  /  AlkPhos  73  08-12      FINAL DISCHARGE INTERVIEW:  Resident(s) Present: (Name:_Dr. Hopper_), RN Present: (Name:  ___________)    DISCHARGE MEDICATION RECONCILIATION  reviewed with Attending (Name:Dr. Elkins_)    DISPOSITION:   [x  ] Home,    [  ] Home with Visiting Nursing Services,   [    ]  SNF/ NH,    [   ] Acute Rehab (4A),   [   ] Other (Specify:_________)
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
## 2019-08-13 NOTE — PROGRESS NOTE ADULT - ASSESSMENT
ASSESSMENT  23 y.o. M with no PMH is admitted due to sepsis and anemia.    IMPRESSION  #Sepsis on admission: P>90 WBC 18   - Sepsis is likely due to recent R hip drain from fasciotomy due to compartment syndrome, cannot r/o infection hematoma    UA neg    CTA no PE 8/4  #Anemia    RECOMMENDATIONS  This note is not complete, all recommendations to follow.   - C/w vancomycin and cefepime  - on d/c on PO levaquin 750mg daily and doxy 100mg BID to complete 7 days

## 2019-08-13 NOTE — DISCHARGE NOTE NURSING/CASE MANAGEMENT/SOCIAL WORK - NSDCDPATPORTLINK_GEN_ALL_CORE
You can access the [facility] Patient Portal, offered by [facility], by registering with the following website: [email or web address]

## 2019-08-15 LAB
CULTURE RESULTS: SIGNIFICANT CHANGE UP
CULTURE RESULTS: SIGNIFICANT CHANGE UP
GAMMA INTERFERON BACKGROUND BLD IA-ACNC: 0.02 IU/ML — SIGNIFICANT CHANGE UP
M TB IFN-G BLD-IMP: ABNORMAL
M TB IFN-G CD4+ BCKGRND COR BLD-ACNC: 0 IU/ML — SIGNIFICANT CHANGE UP
M TB IFN-G CD4+CD8+ BCKGRND COR BLD-ACNC: 0 IU/ML — SIGNIFICANT CHANGE UP
QUANT TB PLUS MITOGEN MINUS NIL: 0.41 IU/ML — SIGNIFICANT CHANGE UP
SPECIMEN SOURCE: SIGNIFICANT CHANGE UP
SPECIMEN SOURCE: SIGNIFICANT CHANGE UP
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
## 2019-08-16 DIAGNOSIS — S30.0XXD CONTUSION OF LOWER BACK AND PELVIS, SUBSEQUENT ENCOUNTER: ICD-10-CM

## 2019-08-16 DIAGNOSIS — Z98.890 OTHER SPECIFIED POSTPROCEDURAL STATES: ICD-10-CM

## 2019-08-16 DIAGNOSIS — Z87.891 PERSONAL HISTORY OF NICOTINE DEPENDENCE: ICD-10-CM

## 2019-08-16 DIAGNOSIS — A41.9 SEPSIS, UNSPECIFIED ORGANISM: ICD-10-CM

## 2019-08-16 DIAGNOSIS — D62 ACUTE POSTHEMORRHAGIC ANEMIA: ICD-10-CM

## 2019-08-16 DIAGNOSIS — T81.44XA SEPSIS FOLLOWING A PROCEDURE, INITIAL ENCOUNTER: ICD-10-CM

## 2019-08-16 DIAGNOSIS — R50.9 FEVER, UNSPECIFIED: ICD-10-CM

## 2019-08-16 DIAGNOSIS — S42.101D FRACTURE OF UNSPECIFIED PART OF SCAPULA, RIGHT SHOULDER, SUBSEQUENT ENCOUNTER FOR FRACTURE WITH ROUTINE HEALING: ICD-10-CM

## 2019-08-16 DIAGNOSIS — B99.8 OTHER INFECTIOUS DISEASE: ICD-10-CM

## 2019-08-17 LAB
CULTURE RESULTS: SIGNIFICANT CHANGE UP
SPECIMEN SOURCE: SIGNIFICANT CHANGE UP

## 2020-12-29 NOTE — ED ADULT TRIAGE NOTE - PAIN: PRESENCE, MLM
Writer called to give report to the [facility] at [place] with no response. Will try again.    Attempted to call to give report again at 1645  but no response.   complains of pain/discomfort

## 2022-10-18 PROBLEM — Z00.00 ENCOUNTER FOR PREVENTIVE HEALTH EXAMINATION: Status: ACTIVE | Noted: 2022-10-18

## 2022-11-17 ENCOUNTER — FORM ENCOUNTER (OUTPATIENT)
Age: 27
End: 2022-11-17
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
